# Patient Record
Sex: FEMALE | Race: WHITE | NOT HISPANIC OR LATINO | Employment: PART TIME | URBAN - METROPOLITAN AREA
[De-identification: names, ages, dates, MRNs, and addresses within clinical notes are randomized per-mention and may not be internally consistent; named-entity substitution may affect disease eponyms.]

---

## 2023-03-28 DIAGNOSIS — F41.9 ANXIETY: ICD-10-CM

## 2023-03-28 RX ORDER — BUSPIRONE HYDROCHLORIDE 7.5 MG/1
1 TABLET ORAL 3 TIMES DAILY
COMMUNITY
Start: 2022-02-14 | End: 2023-03-28 | Stop reason: SDUPTHER

## 2023-03-28 RX ORDER — BUSPIRONE HYDROCHLORIDE 7.5 MG/1
7.5 TABLET ORAL 3 TIMES DAILY
Qty: 270 TABLET | Refills: 1 | Status: SHIPPED | OUTPATIENT
Start: 2023-03-28 | End: 2023-03-29 | Stop reason: SDUPTHER

## 2023-03-29 DIAGNOSIS — F41.9 ANXIETY: ICD-10-CM

## 2023-03-29 RX ORDER — BUSPIRONE HYDROCHLORIDE 7.5 MG/1
7.5 TABLET ORAL 3 TIMES DAILY
Qty: 270 TABLET | Refills: 0 | OUTPATIENT
Start: 2023-03-29

## 2023-03-29 RX ORDER — BUSPIRONE HYDROCHLORIDE 7.5 MG/1
7.5 TABLET ORAL 3 TIMES DAILY
Qty: 270 TABLET | Refills: 0 | Status: SHIPPED | OUTPATIENT
Start: 2023-03-29 | End: 2023-03-31 | Stop reason: SDUPTHER

## 2023-03-30 DIAGNOSIS — F41.9 ANXIETY: ICD-10-CM

## 2023-03-31 RX ORDER — BUSPIRONE HYDROCHLORIDE 7.5 MG/1
7.5 TABLET ORAL 3 TIMES DAILY
Qty: 270 TABLET | Refills: 0 | Status: SHIPPED | OUTPATIENT
Start: 2023-03-31 | End: 2023-06-27

## 2023-03-31 RX ORDER — BUSPIRONE HYDROCHLORIDE 7.5 MG/1
TABLET ORAL
Qty: 270 TABLET | Refills: 2 | OUTPATIENT
Start: 2023-03-31

## 2023-06-26 DIAGNOSIS — F41.9 ANXIETY: ICD-10-CM

## 2023-06-27 RX ORDER — BUSPIRONE HYDROCHLORIDE 7.5 MG/1
TABLET ORAL
Qty: 270 TABLET | Refills: 0 | Status: SHIPPED | OUTPATIENT
Start: 2023-06-27 | End: 2023-09-15

## 2023-09-09 ASSESSMENT — PROMIS GLOBAL HEALTH SCALE
RATE_AVERAGE_PAIN: 0
CARRYOUT_PHYSICAL_ACTIVITIES: COMPLETELY
RATE_AVERAGE_FATIGUE: MILD
RATE_PHYSICAL_HEALTH: VERY GOOD
RATE_GENERAL_HEALTH: VERY GOOD
RATE_SOCIAL_SATISFACTION: EXCELLENT
CARRYOUT_SOCIAL_ACTIVITIES: EXCELLENT
RATE_MENTAL_HEALTH: GOOD
RATE_QUALITY_OF_LIFE: VERY GOOD
EMOTIONAL_PROBLEMS: RARELY

## 2023-09-11 ENCOUNTER — OFFICE VISIT (OUTPATIENT)
Dept: PRIMARY CARE | Facility: CLINIC | Age: 38
End: 2023-09-11
Payer: COMMERCIAL

## 2023-09-11 VITALS
RESPIRATION RATE: 16 BRPM | HEIGHT: 67 IN | SYSTOLIC BLOOD PRESSURE: 122 MMHG | DIASTOLIC BLOOD PRESSURE: 72 MMHG | HEART RATE: 74 BPM | WEIGHT: 148 LBS | BODY MASS INDEX: 23.23 KG/M2 | TEMPERATURE: 97.4 F

## 2023-09-11 DIAGNOSIS — Z23 IMMUNIZATION DUE: ICD-10-CM

## 2023-09-11 DIAGNOSIS — Z00.00 ROUTINE GENERAL MEDICAL EXAMINATION AT A HEALTH CARE FACILITY: Primary | ICD-10-CM

## 2023-09-11 PROBLEM — J45.909 ASTHMA (HHS-HCC): Status: ACTIVE | Noted: 2023-09-11

## 2023-09-11 PROBLEM — G43.909 MIGRAINE: Status: ACTIVE | Noted: 2023-09-11

## 2023-09-11 PROBLEM — J30.9 ALLERGIC RHINITIS: Status: ACTIVE | Noted: 2023-09-11

## 2023-09-11 PROBLEM — E53.8 VITAMIN B12 DEFICIENCY: Status: ACTIVE | Noted: 2023-09-11

## 2023-09-11 PROBLEM — K59.09 CHRONIC CONSTIPATION: Status: ACTIVE | Noted: 2023-09-11

## 2023-09-11 PROBLEM — F41.9 ANXIETY DISORDER: Status: ACTIVE | Noted: 2023-09-11

## 2023-09-11 PROBLEM — E55.9 VITAMIN D DEFICIENCY: Status: ACTIVE | Noted: 2023-09-11

## 2023-09-11 PROBLEM — K21.9 GERD (GASTROESOPHAGEAL REFLUX DISEASE): Status: ACTIVE | Noted: 2023-09-11

## 2023-09-11 PROCEDURE — 1036F TOBACCO NON-USER: CPT | Performed by: FAMILY MEDICINE

## 2023-09-11 PROCEDURE — 90471 IMMUNIZATION ADMIN: CPT | Performed by: FAMILY MEDICINE

## 2023-09-11 PROCEDURE — 99395 PREV VISIT EST AGE 18-39: CPT | Performed by: FAMILY MEDICINE

## 2023-09-11 PROCEDURE — 90686 IIV4 VACC NO PRSV 0.5 ML IM: CPT | Performed by: FAMILY MEDICINE

## 2023-09-11 RX ORDER — CARBINOXAMINE MALEATE 4 MG/1
TABLET ORAL
COMMUNITY

## 2023-09-11 RX ORDER — TEZEPELUMAB-EKKO 210 MG/1.9ML
210 INJECTION, SOLUTION SUBCUTANEOUS
COMMUNITY
Start: 2023-06-21

## 2023-09-11 RX ORDER — PNV NO.95/FERROUS FUM/FOLIC AC 28MG-0.8MG
1 TABLET ORAL EVERY OTHER DAY
COMMUNITY

## 2023-09-11 RX ORDER — BUDESONIDE, GLYCOPYRROLATE, AND FORMOTEROL FUMARATE 160; 9; 4.8 UG/1; UG/1; UG/1
2 AEROSOL, METERED RESPIRATORY (INHALATION) EVERY 12 HOURS
COMMUNITY

## 2023-09-11 RX ORDER — ALBUTEROL SULFATE 90 UG/1
2 AEROSOL, METERED RESPIRATORY (INHALATION) EVERY 4 HOURS PRN
COMMUNITY
Start: 2019-12-06

## 2023-09-11 RX ORDER — MONTELUKAST SODIUM 10 MG/1
10 TABLET ORAL DAILY
COMMUNITY
End: 2023-12-11 | Stop reason: ALTCHOICE

## 2023-09-11 RX ORDER — LINACLOTIDE 145 UG/1
145 CAPSULE, GELATIN COATED ORAL DAILY
COMMUNITY

## 2023-09-11 RX ORDER — LORATADINE 10 MG/1
10 TABLET ORAL DAILY
COMMUNITY
Start: 2023-08-31 | End: 2023-09-11 | Stop reason: ALTCHOICE

## 2023-09-11 NOTE — PROGRESS NOTES
"Subjective   Patient ID: Eden Quispe is a 38 y.o. female who presents for Annual Exam (Patient also wants the flu shot. ).    Subjective  Reason for Visit: Eden Quispe is an 38 y.o. female here for a Wellness visit.   Problems: No    Past Medical, Surgical, and Family History reviewed and updated in chart.    Reviewed all medications by prescribing practitioner or clinical pharmacist (such as prescriptions, OTCs, herbal therapies and supplements) and documented in the medical record.  Does well on Linzess for the constipation      Menses regular: Yes  PAP: Yes    Date: per gyn   Normal PAP: Yes  Sexually active: Yes  Dentist: Yes  Vitamins: Yes  Exercise:  Yes  Immunization up to date: Yes               Review of Systems    Objective   /72   Pulse 74   Temp 36.3 °C (97.4 °F)   Resp 16   Ht 1.702 m (5' 7\")   Wt 67.1 kg (148 lb)   BMI 23.18 kg/m²     Physical Exam  Vitals and nursing note reviewed.   Constitutional:       Appearance: Normal appearance.   HENT:      Head: Normocephalic.      Right Ear: Tympanic membrane, ear canal and external ear normal. There is no impacted cerumen.      Left Ear: Tympanic membrane, ear canal and external ear normal. There is no impacted cerumen.      Nose: Nose normal.      Mouth/Throat:      Mouth: Mucous membranes are moist.   Eyes:      Conjunctiva/sclera: Conjunctivae normal.   Neck:      Thyroid: No thyroid mass or thyromegaly.   Cardiovascular:      Rate and Rhythm: Normal rate and regular rhythm.   Pulmonary:      Effort: Pulmonary effort is normal.      Breath sounds: Normal breath sounds.   Chest:   Breasts:     Right: No inverted nipple, mass, nipple discharge, skin change or tenderness.      Left: No inverted nipple, mass, nipple discharge, skin change or tenderness.   Abdominal:      General: Abdomen is flat.      Palpations: Abdomen is soft. There is no mass.      Tenderness: There is no abdominal tenderness.   Musculoskeletal:      Cervical back: No " tenderness.   Lymphadenopathy:      Cervical: No cervical adenopathy.   Skin:     General: Skin is warm.      Findings: No rash.   Neurological:      General: No focal deficit present.      Mental Status: She is alert and oriented to person, place, and time.   Psychiatric:         Mood and Affect: Mood normal.         Behavior: Behavior normal.         Thought Content: Thought content normal.         Judgment: Judgment normal.         Assessment/Plan   Problem List Items Addressed This Visit    None  Visit Diagnoses       Routine general medical examination at a health care facility    -  Primary    Immunization due        Relevant Orders    Flu vaccine (IIV4) age 6 months and greater, preservative free (Completed)

## 2023-09-14 DIAGNOSIS — F41.9 ANXIETY: ICD-10-CM

## 2023-09-15 RX ORDER — BUSPIRONE HYDROCHLORIDE 7.5 MG/1
TABLET ORAL
Qty: 270 TABLET | Refills: 0 | Status: SHIPPED | OUTPATIENT
Start: 2023-09-15 | End: 2023-11-07

## 2023-11-06 DIAGNOSIS — F41.9 ANXIETY: ICD-10-CM

## 2023-11-07 RX ORDER — BUSPIRONE HYDROCHLORIDE 7.5 MG/1
TABLET ORAL
Qty: 270 TABLET | Refills: 1 | Status: SHIPPED | OUTPATIENT
Start: 2023-11-07

## 2023-12-11 ENCOUNTER — OFFICE VISIT (OUTPATIENT)
Dept: PRIMARY CARE | Facility: CLINIC | Age: 38
End: 2023-12-11
Payer: COMMERCIAL

## 2023-12-11 VITALS
HEART RATE: 83 BPM | BODY MASS INDEX: 23.86 KG/M2 | SYSTOLIC BLOOD PRESSURE: 136 MMHG | HEIGHT: 67 IN | WEIGHT: 152 LBS | DIASTOLIC BLOOD PRESSURE: 77 MMHG

## 2023-12-11 DIAGNOSIS — I87.2 CHRONIC VENOUS INSUFFICIENCY OF LOWER EXTREMITY: Primary | ICD-10-CM

## 2023-12-11 PROCEDURE — 99213 OFFICE O/P EST LOW 20 MIN: CPT | Performed by: INTERNAL MEDICINE

## 2023-12-11 PROCEDURE — 1036F TOBACCO NON-USER: CPT | Performed by: INTERNAL MEDICINE

## 2023-12-11 ASSESSMENT — LIFESTYLE VARIABLES
SKIP TO QUESTIONS 9-10: 1
HOW OFTEN DO YOU HAVE A DRINK CONTAINING ALCOHOL: MONTHLY OR LESS
AUDIT-C TOTAL SCORE: 1
HOW MANY STANDARD DRINKS CONTAINING ALCOHOL DO YOU HAVE ON A TYPICAL DAY: 1 OR 2
HOW OFTEN DO YOU HAVE SIX OR MORE DRINKS ON ONE OCCASION: NEVER

## 2023-12-11 ASSESSMENT — PATIENT HEALTH QUESTIONNAIRE - PHQ9
SUM OF ALL RESPONSES TO PHQ9 QUESTIONS 1 AND 2: 0
2. FEELING DOWN, DEPRESSED OR HOPELESS: NOT AT ALL
1. LITTLE INTEREST OR PLEASURE IN DOING THINGS: NOT AT ALL

## 2023-12-11 NOTE — PROGRESS NOTES
Assessment/Plan   38 years old female with history of chronic venous insufficiency and has had successful treatment has some residual surface vein including reticular and spider veins.  Patient's symptoms are out of proportion to the clinical and also ultrasound findings of the chronic venous insufficiency.  I reviewed the cosmetic treatments but also informed the patient that they may not improve patient's symptoms.  To confirm there is no worsening chronic venous insufficiency she will have a detailed venous ultrasound evaluation done.  If the venous ultrasound evaluation does not show any significant refluxing superficial or deep venous system or any other abnormalities patient understands she should go back to the primary care physician and discussed with them about the symptoms and the management options.  I advised her to start taking oral magnesium at this time.  She also could see a rheumatologist if it is agreed by the primary care physician after the evaluation from the primary care physician.  Patient verbalizes the understanding.    If the ultrasound scan is without any significant venous reflux or any other venous abnormalities I will follow her up in 6 months time.  Otherwise she will be seen sooner to decide about the interventions or management options.      Problem List Items Addressed This Visit       Chronic venous insufficiency of lower extremity - Primary    Relevant Orders    Vascular US Lower Extremity Vein Mapping Bilateral       Subjective     Patient ID: Eden Quispe is a 38 y.o. female who presents for Follow-up (Left leg pain).    History of present illness  38 years old female who has had chronic venous insufficiency and successfully treated came for a follow-up visit and having concern of her leg symptoms which exacerbated or started recently.  She is complaining of cramps in her both thighs more in the medial side of the left thigh.  She has some reticular veins and she says that  has slightly increased.  She gets occasional swellings.  She is wearing the graduated compression stockings as much as possible.    She denies any recent history of travel.    No history of DVT or PE.    Patient's previous records and the treatments were reviewed in detail.  ROS    Family History   Problem Relation Name Age of Onset    No Known Problems Mother      No Known Problems Father        Social History     Socioeconomic History    Marital status:      Spouse name: Not on file    Number of children: Not on file    Years of education: Not on file    Highest education level: Not on file   Occupational History    Not on file   Tobacco Use    Smoking status: Never    Smokeless tobacco: Never   Substance and Sexual Activity    Alcohol use: Yes    Drug use: Never    Sexual activity: Not on file   Other Topics Concern    Not on file   Social History Narrative    Not on file     Social Determinants of Health     Financial Resource Strain: Not on file   Food Insecurity: Not on file   Transportation Needs: Not on file   Physical Activity: Not on file   Stress: Not on file   Social Connections: Not on file   Intimate Partner Violence: Not on file   Housing Stability: Not on file      Patient has no known allergies.   Current Outpatient Medications   Medication Sig Dispense Refill    albuterol 90 mcg/actuation inhaler Inhale 2 puffs every 4 hours if needed for wheezing.      BACILLUS COAGULANS-INULIN ORAL Take 1 capsule by mouth once daily.      Breztri Aerosphere 160-9-4.8 mcg/actuation HFA aerosol inhaler Inhale 2 puffs every 12 hours.      busPIRone (Buspar) 7.5 mg tablet TAKE ONE TABLET BY MOUTH THREE TIMES A DAY (MORNING, EVENING, AND BEFORE BEDTIME) 270 tablet 1    carbinoxamine maleate 4 mg tablet TAKE ONE TABLET BY MOUTH EVERY 12HRS      cyanocobalamin (Vitamin B-12) 100 mcg tablet Take 1 tablet (100 mcg) by mouth every other day.      Linzess 145 mcg capsule Take 1 capsule (145 mcg) by mouth once daily.       tezepelumab-ekko (Tezspire) SubQ Pen Injector Inject 210 mg under the skin.       No current facility-administered medications for this visit.        Objective     Vitals:    12/11/23 1545   BP: 136/77   Pulse: 83        Physical Exam  Normal-built, well-nourished  with no apparent distress. Alert oriented  Skin:  Normal turgor.  No rash.  Head:  Normocephalic, atraumatic.  Eyes:  Pupils are equal, round,.  No pallor of conjunctivae.  Mucous membranes are moist.  Neck:  Supple.  No JVD.  No carotid bruit.  No thyromegaly. No cervical lymphadenopathy.  No clubbing  Chest:  Vesicular breathing Bilaterally good air entry.  No wheezing.  No crackles.  Heart:  Regular rate and rhythm.  S1, S2 positive.  No murmur.  Abdomen:  Soft and nontender.  Bowel sounds are positive.  No organomegaly.  Extremities:  Bilaterally no pedal pitting edema.  Bilaterally 2+ dorsalis pedis pulses.  No calf tenderness. Homans sign is negative.  Neuro Exam: No focal signs. Gait is normal.     Venous Exam:  Varicose veins in left calf [absent  Varicose veins in left thigh [absent  Varicose veins in right calf [absent  Varicose veins in right posterior thigh [present  Reticular veins in left calf [present  Reticular veins in left thigh [present  Reticular veins in right calf [present  Reticular veins in right thigh [present  Telangiectasias in left calf [present  Telangiectasias in left thigh [present  Telangiectasias in right calf [present].  Telangiectasias in right thigh [present].  Right leg edema [absent  Left leg edema [absent  Hyperpigmentation in left leg [absent  Hyperpigmentation in right leg [absent  Lipodermatosclerosis in right leg absent].  Lipodermatosclerosis in left leg [absent  Dermatitis in left leg [absent  Dermatitis in right leg [absent  Corona phlebectatica in left leg [absent  Corona phlebectatica in right leg [absent  Atrophe davida in left leg [absent  Atrophe davida in right leg [absent  Ulcer(s) in left leg  [absent  Ulcer(s) in right leg [absent        Bilateral CEAP C2rS      Problem List Items Addressed This Visit       Chronic venous insufficiency of lower extremity - Primary    Relevant Orders    Vascular US Lower Extremity Vein Mapping Bilateral        No orders of the defined types were placed in this encounter.       Lab Results   Component Value Date    WBC 5.8 12/20/2022    HGB 12.9 12/20/2022    HCT 39.7 12/20/2022     12/20/2022    CHOL 202 (H) 12/02/2019    TRIG 75 12/02/2019    HDL 85.0 12/02/2019    ALT 13 12/20/2022    AST 16 12/20/2022     12/20/2022    K 3.9 12/20/2022     12/20/2022    CREATININE 0.94 12/20/2022    BUN 15 12/20/2022    CO2 28 12/20/2022    TSH 1.57 06/24/2020     Lab Results   Component Value Date    CHOL 202 (H) 12/02/2019    CHHDL 2.4 12/02/2019       No results found.

## 2023-12-13 ENCOUNTER — OFFICE VISIT (OUTPATIENT)
Dept: PRIMARY CARE | Facility: CLINIC | Age: 38
End: 2023-12-13
Payer: COMMERCIAL

## 2023-12-13 ENCOUNTER — ANCILLARY PROCEDURE (OUTPATIENT)
Dept: RADIOLOGY | Facility: CLINIC | Age: 38
End: 2023-12-13
Payer: COMMERCIAL

## 2023-12-13 VITALS
HEIGHT: 67 IN | HEART RATE: 72 BPM | SYSTOLIC BLOOD PRESSURE: 122 MMHG | DIASTOLIC BLOOD PRESSURE: 74 MMHG | TEMPERATURE: 97.5 F | RESPIRATION RATE: 16 BRPM | WEIGHT: 154 LBS | BODY MASS INDEX: 24.17 KG/M2

## 2023-12-13 DIAGNOSIS — M25.562 LEFT KNEE PAIN, UNSPECIFIED CHRONICITY: ICD-10-CM

## 2023-12-13 DIAGNOSIS — M25.552 PAIN OF LEFT HIP: ICD-10-CM

## 2023-12-13 DIAGNOSIS — M79.652 PAIN OF LEFT THIGH: Primary | ICD-10-CM

## 2023-12-13 PROCEDURE — 73562 X-RAY EXAM OF KNEE 3: CPT | Mod: LEFT SIDE | Performed by: RADIOLOGY

## 2023-12-13 PROCEDURE — 73502 X-RAY EXAM HIP UNI 2-3 VIEWS: CPT | Mod: LEFT SIDE | Performed by: RADIOLOGY

## 2023-12-13 PROCEDURE — 99213 OFFICE O/P EST LOW 20 MIN: CPT | Performed by: FAMILY MEDICINE

## 2023-12-13 PROCEDURE — 73562 X-RAY EXAM OF KNEE 3: CPT | Mod: LT,FY

## 2023-12-13 PROCEDURE — 73502 X-RAY EXAM HIP UNI 2-3 VIEWS: CPT | Mod: LT,FY

## 2023-12-13 PROCEDURE — 1036F TOBACCO NON-USER: CPT | Performed by: FAMILY MEDICINE

## 2023-12-13 RX ORDER — MELOXICAM 15 MG/1
15 TABLET ORAL DAILY
Qty: 30 TABLET | Refills: 1 | Status: SHIPPED | OUTPATIENT
Start: 2023-12-13 | End: 2024-02-05

## 2023-12-13 NOTE — PROGRESS NOTES
"Subjective   Patient ID: Eden Quispe is a 38 y.o. female who presents for Leg Pain (Patient C/O left thigh pain for about a month. ).    Saw vein doctor for the pain and felt not from veins     Dull achy pain left inner leg    Hurts walking   pain in groin down inner leg   better move around     Had burning both inner legs other day when driving   Started when was walking on treadmill  Did insanity work out  Advil helped   No paresthesias   or weakness           Review of Systems    Objective   /74   Pulse 72   Temp 36.4 °C (97.5 °F)   Resp 16   Ht 1.702 m (5' 7\")   Wt 69.9 kg (154 lb)   BMI 24.12 kg/m²     Physical Exam  Musculoskeletal:      Left upper leg: Normal.        Legs:       Comments: Tenderness          Assessment/Plan   Problem List Items Addressed This Visit    None  Visit Diagnoses         Codes    Pain of left thigh    -  Primary M79.652    Relevant Orders    Referral to Sports Medicine    Pain of left hip     M25.552    Relevant Medications    meloxicam (Mobic) 15 mg tablet    Other Relevant Orders    XR hip left with pelvis when performed 2 or 3 views    Left knee pain, unspecified chronicity     M25.562    Relevant Orders    XR knee left 3 views               "

## 2023-12-18 ENCOUNTER — OFFICE VISIT (OUTPATIENT)
Dept: ORTHOPEDIC SURGERY | Facility: CLINIC | Age: 38
End: 2023-12-18
Payer: COMMERCIAL

## 2023-12-18 ENCOUNTER — ANCILLARY PROCEDURE (OUTPATIENT)
Dept: RADIOLOGY | Facility: CLINIC | Age: 38
End: 2023-12-18
Payer: COMMERCIAL

## 2023-12-18 DIAGNOSIS — S76.112A QUADRICEPS STRAIN, LEFT, INITIAL ENCOUNTER: Primary | ICD-10-CM

## 2023-12-18 DIAGNOSIS — S76.112A QUADRICEPS STRAIN, LEFT, INITIAL ENCOUNTER: ICD-10-CM

## 2023-12-18 PROCEDURE — 73552 X-RAY EXAM OF FEMUR 2/>: CPT | Mod: LEFT SIDE | Performed by: RADIOLOGY

## 2023-12-18 PROCEDURE — 73552 X-RAY EXAM OF FEMUR 2/>: CPT | Mod: LT,FY

## 2023-12-18 PROCEDURE — 99204 OFFICE O/P NEW MOD 45 MIN: CPT | Performed by: EMERGENCY MEDICINE

## 2023-12-18 PROCEDURE — 1036F TOBACCO NON-USER: CPT | Performed by: EMERGENCY MEDICINE

## 2023-12-18 ASSESSMENT — PAIN DESCRIPTION - DESCRIPTORS: DESCRIPTORS: DULL;ACHING

## 2023-12-18 ASSESSMENT — PAIN - FUNCTIONAL ASSESSMENT: PAIN_FUNCTIONAL_ASSESSMENT: 0-10

## 2023-12-18 ASSESSMENT — PAIN SCALES - GENERAL: PAINLEVEL_OUTOF10: 3

## 2023-12-18 NOTE — PROGRESS NOTES
Subjective    Patient ID: Eden Quispe is a 38 y.o. female.    Chief Complaint: Pain of the Left Knee (NPV - Left thigh, knee and calf pain. No injury. )     Last Surgery: No surgery found  Last Surgery Date: No surgery found    Eden is a very pleasant 38-year-old female coming in to see me today for left thigh pain.  She was referred here by her PCP Dr. Barry.  She has been having atraumatic left thigh pain since the middle of October.  She had not been very active and had decided to try to start doing an at home exercise program within insanity class.  However after about a week of the classes she was having moderate pain in her thigh located mostly along the anterior medial aspect midway down the left thigh radiating down past the medial aspect of the left knee towards her calf.  Her pain is better now but she is still not very active because she does have pain with working out.  Her pain is also worse with sitting.  Here today it is extremely mild and dull rated 1 or 2 out of 10.  She recently saw her PCP who started her on meloxicam.  She has not yet started physical therapy.  She has a history of a vitamin B12 deficiency.  She has no back pain.  She has no hip pain or isolated knee pain.  She denies any known trauma or injuries.        Objective   Right Knee Exam   Right knee exam is normal.      Left Knee Exam     Muscle Strength   The patient has normal left knee strength.    Tenderness   Left knee tenderness location: Mild discomfort with deep palpation over the left thigh adductors and distal vastus medialis area.    Range of Motion   The patient has normal left knee ROM.    Tests   Mina:  Medial - negative Lateral - negative  Varus: negative Valgus: negative  Lachman:  Anterior - negative      Drawer:  Anterior - negative     Posterior - negative    Other   Erythema: absent  Sensation: normal  Pulse: present  Swelling: none  Effusion: no effusion present    Comments:  Tight hamstrings  Pain  is reproduced with left knee extension against resistance  Left hip exam is normal with normal range of motion and strength testing.  Negative FADIR and negative Ulises            Image Results:  XR hip left with pelvis when performed 2 or 3 views  Narrative: Interpreted By:  Jen Ling,   STUDY:  Left hip, two views.      INDICATION:  Signs/Symptoms:left hip pain.      COMPARISON:  None.      ACCESSION NUMBER(S):  [id number]      ORDERING CLINICIAN:  ABRAHAM CLAY      FINDINGS:  No acute fracture or malalignment.  Left hip joint space is well maintained.  Soft tissues are within normal limits.      Impression: 1. Unremarkable left hip radiographs.      MACRO:  None.      Signed by: Jen Ling 12/14/2023 5:31 PM  Dictation workstation:   [id number]  XR knee left 3 views  Narrative: Interpreted By:  Jen Ling,   STUDY:  Left knee, 3 views.      INDICATION:  Signs/Symptoms:left hip pain.      COMPARISON:  None.      ACCESSION NUMBER(S):  [id number]      ORDERING CLINICIAN:  ABRAHAM CLAY      FINDINGS:  No acute fracture or malalignment.  Joint spaces are well maintained.  No significant knee joint effusion.  Soft tissues are unremarkable.      Impression: 1. Unremarkable left knee radiographs.      MACRO:  None.      Signed by: Jen Ling 12/14/2023 5:31 PM  Dictation workstation:   [id number]    X-rays of the left hip and knee were reviewed and interpreted by me on 12/18/2023 and showed good joint spacing without any evidence of acute injuries or fractures.  Good joint alignment.    Assessment/Plan   Encounter Diagnoses:  Quadriceps strain, left, initial encounter    Orders Placed This Encounter    XR femur left 2+ views    Referral to Physical Therapy     No follow-ups on file.    We discussed her treatment options and agreed to obtain an x-ray of her left femur because of the location of her pain.  However I do believe that her presentation is most consistent with a strain to her  left medial quad in the setting of very tight hamstrings.  We therefore also agreed to send her to physical therapy and for her to develop a home exercise program.  She will also continue taking her meloxicam and will follow-up with me in 4 to 6 weeks.  If she is not much better at that time we could potentially obtain an MRI or consider other options such as an EMG or PRP injections of the left quad.    ** Please excuse any errors in grammar or translation related to this dictation. Voice recognition software was utilized to prepare this document. **       Javy Levy MD  [facility]

## 2023-12-28 ENCOUNTER — APPOINTMENT (OUTPATIENT)
Dept: PRIMARY CARE | Facility: CLINIC | Age: 38
End: 2023-12-28
Payer: COMMERCIAL

## 2024-01-05 NOTE — PROGRESS NOTES
"Subjective   Eden Quispe is a 38 y.o. female who is here for an annual gyn exam.     Concerns for this visit: {GYN Concerns Drop:61038}      Periods are {gyn period regularity:715}, lasting {numbers; 0-10:60088} days. Dysmenorrhea:{gyn dysmenorrhea:716}. Cyclic symptoms include {sys gyn cyclic sx:35891}. No intermenstrual bleeding, spotting, or discharge.  Current contraception: {contraceptive method:5051}  Last PAP: 3/22/20 NML HPV -  History of abnormal Pap smear: {yes***/no:22638::\"no\"}  Family history of uterine or ovarian cancer: {yes***/no:91661::\"no\"}  History of abnormal mammogram: {yes***/no:39312::\"no\"}  Family history of breast cancer: {yes***/no:85606::\"no\"}    Review of Systems    Objective   There were no vitals taken for this visit.  Physical Exam     Assessment/Plan: 38 y.o. yo woman for annual GYN exam    1) Health maintenance:   Pap guidelines discussed (ASCCP). PAP ***  Self breast exam encouraged - mammograms annually starting at age 40.   Diet and exercise reviewed.  Routine follow up with PCP for health maintenance examination encouraged including TSH, cholesterol and vitamin D evaluation.    2) Contraception:  ***; risk factors reviewed and patient meets criteria to continue on this method. Contraceptive options reviewed and information provided.    3) STD screening  ***  {There are no diagnoses linked to this encounter. (Refresh or delete this SmartLink)}   F/U 1 year or as needed  "

## 2024-01-08 ENCOUNTER — OFFICE VISIT (OUTPATIENT)
Dept: OBSTETRICS AND GYNECOLOGY | Facility: CLINIC | Age: 39
End: 2024-01-08
Payer: COMMERCIAL

## 2024-01-08 VITALS
DIASTOLIC BLOOD PRESSURE: 68 MMHG | HEIGHT: 67 IN | SYSTOLIC BLOOD PRESSURE: 118 MMHG | BODY MASS INDEX: 24.4 KG/M2 | WEIGHT: 155.5 LBS

## 2024-01-08 DIAGNOSIS — Z12.31 SCREENING MAMMOGRAM FOR BREAST CANCER: ICD-10-CM

## 2024-01-08 PROCEDURE — 1036F TOBACCO NON-USER: CPT

## 2024-01-08 PROCEDURE — 99395 PREV VISIT EST AGE 18-39: CPT

## 2024-01-08 ASSESSMENT — ENCOUNTER SYMPTOMS
MUSCULOSKELETAL NEGATIVE: 0
EYES NEGATIVE: 0
PSYCHIATRIC NEGATIVE: 0
CONSTITUTIONAL NEGATIVE: 0
ENDOCRINE NEGATIVE: 0
ALLERGIC/IMMUNOLOGIC NEGATIVE: 0
HEMATOLOGIC/LYMPHATIC NEGATIVE: 0
CARDIOVASCULAR NEGATIVE: 0
RESPIRATORY NEGATIVE: 0
GASTROINTESTINAL NEGATIVE: 0
NEUROLOGICAL NEGATIVE: 0

## 2024-01-08 NOTE — PROGRESS NOTES
"Pt has no concerns today.  Last pap 2020, nml, hpv neg  Mamm 1/11/2023 Normal  LMP 12/28/23      Subjective   Eden Quispe is a 38 y.o. female who is here for a routine exam. No vaginal concerns at this time including abnormal discharge, odor or discomfort. She is sexually active with one male partner and has no concern for STI exposure. She has no pain or bleeding with sex. She denies bladder or bowel concerns.      Current contraception: none  LMP: 12/28  Last pap: 2020  History of abnormal Pap smear: no  Due for pap: no  Family history of GYN cancers: unknown, adopted  Last mammogram: cat 3 probably benign due to asymmetry  Gardasil: no      OB History    No obstetric history on file.         Review of Systems   All other systems reviewed and are negative.      Objective   /68 (BP Location: Right arm, Patient Position: Sitting, BP Cuff Size: Adult)   Ht 1.702 m (5' 7\")   Wt 70.5 kg (155 lb 8 oz)   LMP 12/28/2023 (Exact Date)   BMI 24.35 kg/m²     Physical Exam  HENT:      Mouth/Throat:      Mouth: Mucous membranes are moist.   Eyes:      Pupils: Pupils are equal, round, and reactive to light.   Neck:      Thyroid: No thyroid mass, thyromegaly or thyroid tenderness.   Cardiovascular:      Rate and Rhythm: Normal rate and regular rhythm.      Pulses: Normal pulses.   Pulmonary:      Effort: Pulmonary effort is normal.      Breath sounds: Normal breath sounds.   Chest:   Breasts:     Breasts are symmetrical.      Right: Normal.      Left: Normal.   Abdominal:      General: Abdomen is flat.      Palpations: Abdomen is soft.      Hernia: There is no hernia in the left inguinal area or right inguinal area.   Genitourinary:     General: Normal vulva.      Uterus: Normal.       Adnexa: Right adnexa normal and left adnexa normal.   Musculoskeletal:         General: Normal range of motion.      Cervical back: Normal range of motion.   Lymphadenopathy:      Cervical: No cervical adenopathy.      Right cervical: " No superficial, deep or posterior cervical adenopathy.     Left cervical: No superficial, deep or posterior cervical adenopathy.      Lower Body: No right inguinal adenopathy. No left inguinal adenopathy.   Skin:     General: Skin is warm.      Capillary Refill: Capillary refill takes less than 2 seconds.   Neurological:      Mental Status: She is alert and oriented to person, place, and time.   Psychiatric:         Mood and Affect: Mood normal.         Behavior: Behavior normal.          Assessment/Plan   A&P --> 38 y.o. y.o woman for annual GYN exam.     - Health Maintenance -->  - Routine follow up with PCP for health maintenance examination encouraged, including TSH, cholesterol, and Vit. D evaluation.  Self breast awareness encouraged; concerning characteristics of breasts reviewed - Pt. will report general concerns, any adherent lumps, skin dimpling/puckering or color changes, and any nipple discharge. Mammogram order provided    Diet and exercise reviewed.     Pap, next due in 2025:- Reviewed ACOG and ASCCP guidelines with pt.        - STD Screening: declines     - Contraception Plan: none     - F/U 1 year or as needed.      RUFINO Kohli-CARMELA

## 2024-01-10 ENCOUNTER — OFFICE VISIT (OUTPATIENT)
Dept: ALLERGY | Facility: CLINIC | Age: 39
End: 2024-01-10
Payer: COMMERCIAL

## 2024-01-10 VITALS
RESPIRATION RATE: 14 BRPM | SYSTOLIC BLOOD PRESSURE: 118 MMHG | TEMPERATURE: 98.2 F | HEART RATE: 92 BPM | WEIGHT: 153 LBS | OXYGEN SATURATION: 99 % | DIASTOLIC BLOOD PRESSURE: 64 MMHG | BODY MASS INDEX: 23.96 KG/M2

## 2024-01-10 DIAGNOSIS — J45.50 SEVERE PERSISTENT ASTHMA WITHOUT COMPLICATION (MULTI): ICD-10-CM

## 2024-01-10 DIAGNOSIS — K21.9 GASTROESOPHAGEAL REFLUX DISEASE WITHOUT ESOPHAGITIS: ICD-10-CM

## 2024-01-10 DIAGNOSIS — J30.9 ALLERGIC RHINITIS, UNSPECIFIED SEASONALITY, UNSPECIFIED TRIGGER: Primary | ICD-10-CM

## 2024-01-10 PROCEDURE — 1036F TOBACCO NON-USER: CPT | Performed by: ALLERGY & IMMUNOLOGY

## 2024-01-10 PROCEDURE — 99214 OFFICE O/P EST MOD 30 MIN: CPT | Performed by: ALLERGY & IMMUNOLOGY

## 2024-01-10 ASSESSMENT — ENCOUNTER SYMPTOMS
ENDOCRINE NEGATIVE: 1
FEVER: 0
CARDIOVASCULAR NEGATIVE: 1
CONSTITUTIONAL NEGATIVE: 1
GASTROINTESTINAL NEGATIVE: 1
RESPIRATORY NEGATIVE: 1
PSYCHIATRIC NEGATIVE: 1
MUSCULOSKELETAL NEGATIVE: 1
EYES NEGATIVE: 1
HEMATOLOGIC/LYMPHATIC NEGATIVE: 1
NEUROLOGICAL NEGATIVE: 1

## 2024-01-10 NOTE — PROGRESS NOTES
Subjective   Patient ID: Eden Quispe is a 38 y.o. female.    Chief Complaint: Follow up asthma  Tezspire, Breztri and Albuterol  No oral steroids or hospitalizations.  Did have COVID 12/28-mostly fatigue/gi.  Has some drainage causing cough. No SOB or wheeze.    When she was unable to have access to her biologic X3 months due to inurance coverage/approval pending, she had increased symptoms and need for albuterol.     Review of Systems   Constitutional: Negative.  Negative for fever.   HENT:  Positive for congestion.    Eyes: Negative.    Respiratory: Negative.     Cardiovascular: Negative.    Gastrointestinal: Negative.    Endocrine: Negative.    Genitourinary: Negative.    Musculoskeletal: Negative.    Neurological: Negative.    Hematological: Negative.    Psychiatric/Behavioral: Negative.     /64   Pulse 92   Temp 36.8 °C (98.2 °F)   Resp 14   Wt 69.4 kg (153 lb)   LMP 12/28/2023 (Exact Date)   SpO2 99%   BMI 23.96 kg/m²       Objective   Physical Exam  Vitals and nursing note reviewed.   Constitutional:       Appearance: Normal appearance.   HENT:      Right Ear: Tympanic membrane normal.      Left Ear: Tympanic membrane normal.      Nose: Congestion present.      Mouth/Throat:      Mouth: Mucous membranes are moist.   Eyes:      Conjunctiva/sclera: Conjunctivae normal.      Pupils: Pupils are equal, round, and reactive to light.   Cardiovascular:      Rate and Rhythm: Normal rate and regular rhythm.      Heart sounds: Normal heart sounds.   Pulmonary:      Effort: Pulmonary effort is normal.      Breath sounds: Normal breath sounds.   Abdominal:      General: Bowel sounds are normal.      Palpations: Abdomen is soft.   Musculoskeletal:         General: Normal range of motion.   Skin:     General: Skin is warm and dry.      Capillary Refill: Capillary refill takes less than 2 seconds.   Neurological:      General: No focal deficit present.      Mental Status: She is alert and oriented to person,  place, and time.   Psychiatric:         Mood and Affect: Mood normal.         Assessment/Plan    Asthma well controlled on Breztri, Tezspire. No longer needing Singulair.  -consider trial of as needed AirSupra for rescue inhaler.  -will defer spirometry today due to recent illness, but plan for doing this at next follow up.  -if asthma continues to be stable, we can explore starting allergy immunotherapy.      Plan follow up in 4 months

## 2024-01-15 ENCOUNTER — ANCILLARY PROCEDURE (OUTPATIENT)
Dept: RADIOLOGY | Facility: CLINIC | Age: 39
End: 2024-01-15
Payer: COMMERCIAL

## 2024-01-15 DIAGNOSIS — Z12.31 SCREENING MAMMOGRAM FOR BREAST CANCER: ICD-10-CM

## 2024-01-15 PROCEDURE — 77067 SCR MAMMO BI INCL CAD: CPT

## 2024-01-15 PROCEDURE — 77063 BREAST TOMOSYNTHESIS BI: CPT | Performed by: STUDENT IN AN ORGANIZED HEALTH CARE EDUCATION/TRAINING PROGRAM

## 2024-01-15 PROCEDURE — 77067 SCR MAMMO BI INCL CAD: CPT | Performed by: STUDENT IN AN ORGANIZED HEALTH CARE EDUCATION/TRAINING PROGRAM

## 2024-01-18 ENCOUNTER — APPOINTMENT (OUTPATIENT)
Dept: PRIMARY CARE | Facility: CLINIC | Age: 39
End: 2024-01-18
Payer: COMMERCIAL

## 2024-01-23 ENCOUNTER — EVALUATION (OUTPATIENT)
Dept: PHYSICAL THERAPY | Facility: CLINIC | Age: 39
End: 2024-01-23
Payer: COMMERCIAL

## 2024-01-23 DIAGNOSIS — M79.652 MUSCULOSKELETAL PAIN OF LEFT THIGH: Primary | ICD-10-CM

## 2024-01-23 DIAGNOSIS — S76.112A QUADRICEPS STRAIN, LEFT, INITIAL ENCOUNTER: ICD-10-CM

## 2024-01-23 PROCEDURE — 97161 PT EVAL LOW COMPLEX 20 MIN: CPT | Mod: GP | Performed by: PHYSICAL THERAPIST

## 2024-01-23 PROCEDURE — 97110 THERAPEUTIC EXERCISES: CPT | Mod: GP | Performed by: PHYSICAL THERAPIST

## 2024-01-23 ASSESSMENT — PAIN SCALES - GENERAL: PAINLEVEL_OUTOF10: 0 - NO PAIN

## 2024-01-23 ASSESSMENT — PAIN - FUNCTIONAL ASSESSMENT: PAIN_FUNCTIONAL_ASSESSMENT: 0-10

## 2024-01-23 ASSESSMENT — PAIN DESCRIPTION - DESCRIPTORS: DESCRIPTORS: ACHING;DULL

## 2024-01-23 NOTE — PROGRESS NOTES
Physical Therapy    Physical Therapy Evaluation and Treatment      Patient Name: Eden Quispe  MRN: [id number]  Today's Date: 1/23/2024  Time Calculation  Start Time: 1432  Stop Time: 1515  Time Calculation (min): 43 min    Insurance:  Visit:  1 of 40  Auth required: No  Payor: [employer] / Plan: [employer] / Product Type: *No Product type* /     Assessment:  PT Assessment  PT Assessment Results: Decreased strength, Decreased range of motion, Pain  Rehab Prognosis: Good  Evaluation/Treatment Tolerance: Patient tolerated treatment well   Patient presents to physical therapy with c/o left quadriceps strain. Patient reports that pain started in L inner thigh in October. She is unsure if it was from starting an Ins[employer] workout program or walking frequently on a treadmill, but suspects her injury was exercise induced. She describes it as a dull pain in the upper medial aspect of the L knee and into the thigh. Pain occasionally radiates into the calf. Pt states she feel tightness on the inside of her thigh and calf as well. Pt presents with decreased hip strength, pain, and tightness in the adductors and calf. S/s consistent with referring diagnosis.     Plan:  OP PT Plan  Treatment/Interventions: Blood flow restriction therapy, Cryotherapy, Dry needling, Education/ Instruction, Electrical stimulation, Gait training, Hot pack, Manual therapy, Neuromuscular re-education, Self care/ home management, Taping techniques, Therapeutic activities, Therapeutic exercises, Ultrasound  PT Plan: Skilled PT  PT Frequency: 1 time per week  Duration: 12 weeks  Onset Date: 10/01/23  Rehab Potential: Good  Plan of Care Agreement: Patient    Current Problem:   1. Musculoskeletal pain of left thigh  Follow Up In Physical Therapy      2. Quadriceps strain, left, initial encounter  Referral to Physical Therapy    Follow Up In Physical Therapy          Subjective    Patient presents to physical therapy for  evaluation of L quad strain. Patient reports that pain started in L inner thigh in October. She is unsure if it was from starting an Insanity workout program or walking frequently on a treadmill. She suspects that her injury was exercise induced. She describes it as a dull pain primarily in the upper medial aspect of the L knee/into the thigh. The pain also radiates down into the calf. Pt states she feel tightness on the inside of her thigh and calf.     PREVIOUS INTERVENTION:  Meloxicam    EXACERBATING FACTORS:  Sitting  High impact exercise    RELIEVING FACTORS:  Changing position  Meloxicam- has helped a lot     OCCUPATION:       HOBBIES:  Regular workout routine    HOME SETUP:  No difficulty on stairs     BARRIERS IMPACTING CARE:  N/A    General:  General  Reason for Referral: Left quadriceps strain  Referred By: Dr. Levy    Precautions:  Precautions  Medical Precautions: No known precautions/limitation (Medical hx reviewed. Not likely to impact care.)    Pain:  Pain Assessment  Pain Assessment: 0-10  Pain Score: 0 - No pain  Pain Location: Knee  Pain Orientation: Left  Pain Descriptors: Aching, Dull  Pain Frequency: Intermittent    Prior Level of Function:  Prior Function Per Pt/Caregiver Report  Level of H[place]: Independent with ADLs and functional transfers    Objective   OBSERVATION  Increased valgus with single leg squat R/L  Some difficulty with balance in SLS    AROM  R knee AROM WFL   L knee AROM WFL     STRENGTH  R hip flexion 5/5  R hip extension 4/5  R hip ABD 4+/5  R hip ER 5/5  R hip IR 5/5    L hip flexion 5/5  L hip extension 4-/5  L hip ABD 4+/5  L hip ER 5/5  L hip IR 5/5    PALPATION  Slight TTP L adductors    SPECIAL TESTS  (-) MEHNAZ  (-) Scour  (-) FADDIR    Outcome Measures:  Other Measures  Lower Extremity Funtional Score (LEFS): 78/80     Treatments:  TREATMENT  THERAPEUTIC EXERCISE:  Access Code: [email or web address]  URL:  [email or web address]  Date: 01/23/2024  Prepared by: Leatha Noonan    Exercises  - Supine Bridge  - 2 x daily - 7 x weekly - 1 sets - 10 reps  - Standing Isometric Hip Abduction with Ball on Wall  - 1 x daily - 7 x weekly - 1 sets - 10 reps  - Clamshell  - 2 x daily - 7 x weekly - 1 sets - 10 reps  - Sidelying Hip Abduction  - 2 x daily - 7 x weekly - 1 sets - 10 reps  - Gastroc Stretch on Step  - 2 x daily - 7 x weekly - 1 sets - 10 reps  - Side Lunge Adductor Stretch  - 2 x daily - 7 x weekly - 1 sets - 10 reps    EDUCATION:   Patient education on diagnosis/prognosis, pathophysiology, POC, and HEP.  Patient demonstrates understanding of HEP/POC.    Goals:  1. Pain 0/10  2. Outcomes Measure:  LEFS 80/80  3. Increase hip extension/abduction to 5/5 to allow patient to resume impact loading w/o increased symptoms  4. Demonstrates independence with HEP.    Care was provided for this patient by ADRIÁN Garcia.

## 2024-01-30 ENCOUNTER — TREATMENT (OUTPATIENT)
Dept: PHYSICAL THERAPY | Facility: CLINIC | Age: 39
End: 2024-01-30
Payer: COMMERCIAL

## 2024-01-30 DIAGNOSIS — M79.652 MUSCULOSKELETAL PAIN OF LEFT THIGH: ICD-10-CM

## 2024-01-30 DIAGNOSIS — S76.112A QUADRICEPS STRAIN, LEFT, INITIAL ENCOUNTER: Primary | ICD-10-CM

## 2024-01-30 PROCEDURE — 97112 NEUROMUSCULAR REEDUCATION: CPT | Mod: GP | Performed by: PHYSICAL THERAPIST

## 2024-01-30 PROCEDURE — 97110 THERAPEUTIC EXERCISES: CPT | Mod: GP | Performed by: PHYSICAL THERAPIST

## 2024-01-30 PROCEDURE — 97140 MANUAL THERAPY 1/> REGIONS: CPT | Mod: GP | Performed by: PHYSICAL THERAPIST

## 2024-01-30 ASSESSMENT — PAIN - FUNCTIONAL ASSESSMENT: PAIN_FUNCTIONAL_ASSESSMENT: 0-10

## 2024-01-30 ASSESSMENT — PAIN SCALES - GENERAL: PAINLEVEL_OUTOF10: 5 - MODERATE PAIN

## 2024-01-30 NOTE — PROGRESS NOTES
Physical Therapy    Physical Therapy Treatment    Patient Name: Eden Quispe  MRN: [id number]  Today's Date: 1/30/2024  Time Calculation  Start Time: 1420  Stop Time: 1514  Time Calculation (min): 54 min    Insurance:  Visit:  2 of 40  Auth required: No  Payor: [employer] / Plan: [employer] / Product Type: *No Product type* /    Assessment:   Pt required max cues for glute activation. Discussed with pt that medial thigh pain may be due to compensation for weak glutes and HS. Educated patient on attempting glute activation exercises before rest of HEP. Added additional glute activation exercises to HEP. Will continue to educate patient on understanding of this concept.     Plan:   Continue to work on glute activation. Progress hip strengthening as tolerated.     Current Problem  1. Quadriceps strain, left, initial encounter  Follow Up In Physical Therapy      2. Musculoskeletal pain of left thigh  Follow Up In Physical Therapy        General   General  Reason for Referral: Left quadriceps strain  Referred By: Dr. Levy    Subjective    She states that her pain has been coming and going. She still feels it primarily in her medial thigh. Sitting exacerbates symptoms, but propping legs up on a stool provides some relief. Has been walking on the treadmill but pain seems to increase by the end of the day. Compression socks provide some relief.      Precautions  Precautions  Medical Precautions: No known precautions/limitation (Medical hx reviewed. Not likely to impact care.)    Pain  Pain Assessment  Pain Assessment: 0-10  Pain Score: 5 - Moderate pain  Pain Location: Leg  Pain Orientation: Left    Objective   3+/5 L hip extension    Treatments:  THERAPEUTIC EXERCISE:  Recumbent bike random level 1 6 mins  Review of HEP  Active sup HS stretch in 90/90 10x each     MANUAL THERAPY:  Tiger tail to HS     NEUROMUSCULAR RE-EDUCATION:  Glute bridge w/ belt 10x   Quadruped kicks 5x each  Standing  iso hip abduction on wall 5x  3 way single leg stance clock tap 10x   Resisted glute bridge 10x    THERAPEUTIC ACTIVITY:    MODALITIES:      OP EDUCATION:   Access Code: [email or web address]  URL: [email or web address]  Date: 01/30/2024  Prepared by: Leatha Noonan    Exercises  - Supine Bridge  - 2 x daily - 7 x weekly - 1 sets - 10 reps  - Clamshell  - 2 x daily - 7 x weekly - 1 sets - 10 reps  - Sidelying Hip Abduction  - 2 x daily - 7 x weekly - 1 sets - 10 reps  - Gastroc Stretch on Step  - 2 x daily - 7 x weekly - 1 sets - 10 reps  - Side Lunge Adductor Stretch  - 2 x daily - 7 x weekly - 1 sets - 10 reps  - Single Leg Balance with Clock Reach  - 2 x daily - 7 x weekly - 1 sets - 10 reps  - Seated Hamstring Stretch  - 2 x daily - 7 x weekly - 1 sets - 10 reps  - Quadruped Hip Extension Kicks  - 2 x daily - 7 x weekly - 1 sets - 10 reps    Goals:  1. Pain 0/10  2. Outcomes Measure:  LEFS 80/80  3. Increase hip extension/abduction to 5/5 to allow patient to resume impact loading w/o increased symptoms  4. Demonstrates independence with HEP.     Care was provided for this patient by ADRIÁN Garcia

## 2024-02-04 DIAGNOSIS — M25.552 PAIN OF LEFT HIP: ICD-10-CM

## 2024-02-05 ENCOUNTER — OFFICE VISIT (OUTPATIENT)
Dept: VASCULAR SURGERY | Facility: CLINIC | Age: 39
End: 2024-02-05
Payer: COMMERCIAL

## 2024-02-05 DIAGNOSIS — I87.2 CHRONIC VENOUS INSUFFICIENCY OF LOWER EXTREMITY: Primary | ICD-10-CM

## 2024-02-05 PROCEDURE — 1036F TOBACCO NON-USER: CPT | Performed by: INTERNAL MEDICINE

## 2024-02-05 PROCEDURE — 93970 EXTREMITY STUDY: CPT | Performed by: INTERNAL MEDICINE

## 2024-02-05 RX ORDER — MELOXICAM 15 MG/1
15 TABLET ORAL DAILY
Qty: 30 TABLET | Refills: 1 | Status: SHIPPED | OUTPATIENT
Start: 2024-02-05 | End: 2024-04-09 | Stop reason: SDUPTHER

## 2024-02-05 NOTE — PROGRESS NOTES
Venous Duplex      Indications:  Limb pain  Leg Edema    Sonographer: Beverly Marie RVT    TECHNIQUE:  Venous Duplex ultrasound spectral Doppler wave modality was performed with the patient in the supine and upright positions to determine the status of the deep and superficial systems of the right and left lower extremity. The common femoral, femoral and popliteal veins of the deep venous system were imaged. The superficial system was imaged entirely to include, but was not limited to, the saphenous-femoral junction (SFJ) down the greater saphenous vein from the groin to the ankle, and the saphenous-popliteal junction (SPJ), if present, at the popliteal fossa down the small saphenous vein (SSV) to the ankle. As indicated, the cranial extensions of the SSV (CESSV), the anterior accessory GSV (AAGSV), and the posterior accessory GSV (PAGSV) were also evaluated, if present. All areas meeting the criteria for venous reflux (500 milliseconds or greater in the saphenous veins and principle branches, 350 milliseconds in perforators and 1000 milliseconds in the deep system) were confirmed with spectral Doppler analysis and confirmatory images were documented:     FINDINGS ARE THE FOLLOWING:    RIGHT LEG:  There is no evidence of thrombus in the interrogated segments of the deep or superficial venous system. There is no evidence of deep venous reflux.     GSV dimensions: 4.2mm junction   proximal segment is not visualized  mid segment is not visualized   distal segment is not visualized  The Greater Saphenous Vein appears to be absent, consistent with previous treatment    SSV dimensions: junction is not visualized   mid segment is not visualized  The Small Saphenous Vein appears to be absent, consistent with previous treatment    AAGSV dimensions: 3.0mm   There is <0.5 seconds of reflux in the Anterior Accessory Saphenous Vein    PAGSV dimensions: 2.9mm   There is <0.5 seconds of reflux in the Posterior Accessory Saphenous  Vein    There are multiple incompetent tributaries along the thigh and lower leg.    Trib1 dimensions: 2.1mm  Trib2 dimensions: 2.3mm  There is >0.5 seconds of reflux in the tributaries       LEFT LEG:  There is no evidence of thrombus in the interrogated segments of the deep or superficial venous system. There is no evidence of deep venous reflux.    GSV dimensions: 3.4mm junction   proximal segment is not visualized  mid segment is not visualized   distal segment is not visualized  The Greater Saphenous Vein appears to be absent, consistent with previous treatment    SSV dimensions: 2.3mm junction  2.8mm mid  There is <0.5 seconds of reflux in the Small Saphenous Vein    AAGSV dimensions: 0.0mm   There is <0.5 seconds of reflux in the Anterior Accessory Saphenous Vein      CONCLUSIONS:  There is no evidence of thrombus in the interrogated segments of the deep or superficial venous system in both legs.  No evidence of deep venous reflux.  Both legs superficial truncal veins are successfully treated and no reflux noted.  There are incompetent tributaries are seen in the LT LE legs as noted in the MAP measured less than 2 mm in diameter.    Discussions  Patient's both legs has no significant refluxing superficial truncal veins or tributaries identified in these detail ultrasound evaluation.  Patient should continue the lifestyle modification and also bilateral thigh-high graduated compression stockings as much as possible.

## 2024-02-06 ENCOUNTER — APPOINTMENT (OUTPATIENT)
Dept: PHYSICAL THERAPY | Facility: CLINIC | Age: 39
End: 2024-02-06
Payer: COMMERCIAL

## 2024-02-08 ENCOUNTER — APPOINTMENT (OUTPATIENT)
Dept: PHYSICAL THERAPY | Facility: CLINIC | Age: 39
End: 2024-02-08
Payer: COMMERCIAL

## 2024-02-13 ENCOUNTER — OFFICE VISIT (OUTPATIENT)
Dept: VASCULAR SURGERY | Facility: CLINIC | Age: 39
End: 2024-02-13
Payer: COMMERCIAL

## 2024-02-13 VITALS
DIASTOLIC BLOOD PRESSURE: 96 MMHG | WEIGHT: 153.2 LBS | BODY MASS INDEX: 24.04 KG/M2 | HEART RATE: 72 BPM | HEIGHT: 67 IN | SYSTOLIC BLOOD PRESSURE: 116 MMHG

## 2024-02-13 DIAGNOSIS — I87.2 CHRONIC VENOUS INSUFFICIENCY OF LOWER EXTREMITY: Primary | ICD-10-CM

## 2024-02-13 DIAGNOSIS — I83.93 SPIDER VEINS OF BOTH LOWER EXTREMITIES: ICD-10-CM

## 2024-02-13 PROCEDURE — 1036F TOBACCO NON-USER: CPT | Performed by: INTERNAL MEDICINE

## 2024-02-13 PROCEDURE — 99213 OFFICE O/P EST LOW 20 MIN: CPT | Performed by: INTERNAL MEDICINE

## 2024-02-13 RX ORDER — VIT C/E/ZN/COPPR/LUTEIN/ZEAXAN 250MG-90MG
1 CAPSULE ORAL DAILY
COMMUNITY

## 2024-02-13 NOTE — PROGRESS NOTES
Chief Complaints:  Seen for follow-up after detailed ultrasound evaluation.    HPI:  38 years old female with history of chronic venous insufficiency and was successfully treated came for a follow-up visit after she has had detailed ultrasound evaluation because of her symptoms in the legs.  Patient states since her last visit with me she has seen the PCP and she is going through physical therapy and the left leg symptoms are improving.  She is wearing the stockings but she says the over-the-counter stockings are not tight enough and when she wears the high compression she feels much better.  She has no significant swellings of the leg.    Rest of the review of systems no acute complaints.    ROS:  Respiratory:  No shortness of breath.  No cough, sinus congestion    Cardiovascular:    No chest pain.  No claudication.  No cyanosis.  Palpitations, denies.    Neurologic:    No tingling/Numbness.  Denies loss of strength.    Social History:  Tobacco Use:  None    Current Outpatient Medications on File Prior to Visit   Medication Sig Dispense Refill    albuterol 90 mcg/actuation inhaler Inhale 2 puffs every 4 hours if needed for wheezing.      Breztri Aerosphere 160-9-4.8 mcg/actuation HFA aerosol inhaler Inhale 2 puffs every 12 hours.      busPIRone (Buspar) 7.5 mg tablet TAKE ONE TABLET BY MOUTH THREE TIMES A DAY (MORNING, EVENING, AND BEFORE BEDTIME) 270 tablet 1    carbinoxamine maleate 4 mg tablet TAKE ONE TABLET BY MOUTH EVERY 12HRS      cholecalciferol (Vitamin D3) 25 MCG (1000 UT) capsule Take 1 capsule (25 mcg) by mouth once daily.      cyanocobalamin (Vitamin B-12) 100 mcg tablet Take 1 tablet (100 mcg) by mouth every other day.      Linzess 145 mcg capsule Take 1 capsule (145 mcg) by mouth once daily.      meloxicam (Mobic) 15 mg tablet TAKE 1 TABLET BY MOUTH EVERY DAY 30 tablet 1    tezepelumab-ekko (Tezspire) SubQ Pen Injector Inject 210 mg under the skin.      [DISCONTINUED] BACILLUS COAGULANS-INULIN ORAL  "Take 1 capsule by mouth once daily.       No current facility-administered medications on file prior to visit.        No Known Allergies     Examination:    Visit Vitals  BP (!) 116/96   Pulse 72   Ht 1.702 m (5' 7\")   Wt 69.5 kg (153 lb 3.2 oz)   BMI 23.99 kg/m²   Smoking Status Never   BSA 1.81 m²        Normal-built, well-nourished  with no apparent distress. Alert oriented  Skin:  Normal turgor.  No rash.  Head:  Normocephalic, atraumatic.  Eyes:  Pupils are equal, round,.  No pallor of conjunctivae.  Mucous membranes are moist.  Neck:  Supple.  No JVD.  No carotid bruit.  No thyromegaly. No cervical lymphadenopathy.  No clubbing  Chest:  Vesicular breathing Bilaterally good air entry.  No wheezing.  No crackles.  Heart:  Regular rate and rhythm.  S1, S2 positive.  No murmur.  Extremities:  Bilaterally no pedal pitting edema.  Bilaterally 2+ dorsalis pedis pulses.  No calf tenderness. Homans sign is negative.  Neuro Exam: No focal signs. Gait is normal.     Venous Exam:  Varicose veins in left calf [absent  Varicose veins in left thigh [absent  Varicose veins in right calf [absent  Varicose veins in right posterior thigh [present  Reticular veins in left calf [present  Reticular veins in left thigh [present  Reticular veins in right calf [present  Reticular veins in right thigh [present  Telangiectasias in left calf [present  Telangiectasias in left thigh [present  Telangiectasias in right calf [present].  Telangiectasias in right thigh [present].  Right leg edema [absent  Left leg edema [absent  Hyperpigmentation in left leg [absent  Hyperpigmentation in right leg [absent  Lipodermatosclerosis in right leg absent].  Lipodermatosclerosis in left leg [absent  Dermatitis in left leg [absent  Dermatitis in right leg [absent  Corona phlebectatica in left leg [absent  Corona phlebectatica in right leg [absent  Atrophe davida in left leg [absent  Atrophe davida in right leg [absent  Ulcer(s) in left leg " [absent  Ulcer(s) in right leg [absent        Bilateral CEAP C2rS         Diagnosis/ Problems    Assessment:    Problem List Items Addressed This Visit       Chronic venous insufficiency of lower extremity - Primary    Relevant Orders    Compression Stockings 30-40 mmHg    Spider veins of both lower extremities    Relevant Orders    Referral to Dermatology    Compression Stockings 30-40 mmHg          Plan   38 years old female who has had bilateral chronic venous insufficiency was successfully treated came for a follow-up visit after having detailed ultrasound evaluation.  Patient's detail ultrasound evaluation were reviewed.  She has reflux in the few superficial tributaries in the right leg which are measured less than 3 mm in diameter.  Left leg has no refluxing tributaries.  Patient's symptoms are atypical for chronic venous insufficiency and her exam shows she has reticular veins which very well could be symptomatic.  But her symptoms from the left leg is better with the treatment for musculoskeletal origin.  At this time patient is successfully treated for her symptomatic chronic venous insufficiency and the reticular veins and spider veins needs cosmetic treatment.  The referrals were given to the patient and patient will call the dermatologist office to have the cosmetic treatment arranged.  If patient is doing well with the lifestyle modification and wearing the graduated compression stocking she will follow-up with me in 1 year time.

## 2024-02-23 ENCOUNTER — APPOINTMENT (OUTPATIENT)
Dept: PHYSICAL THERAPY | Facility: CLINIC | Age: 39
End: 2024-02-23
Payer: COMMERCIAL

## 2024-02-26 ENCOUNTER — OFFICE VISIT (OUTPATIENT)
Dept: ORTHOPEDIC SURGERY | Facility: CLINIC | Age: 39
End: 2024-02-26
Payer: COMMERCIAL

## 2024-02-26 DIAGNOSIS — S76.112D QUADRICEPS STRAIN, LEFT, SUBSEQUENT ENCOUNTER: Primary | ICD-10-CM

## 2024-02-26 PROCEDURE — 1036F TOBACCO NON-USER: CPT | Performed by: EMERGENCY MEDICINE

## 2024-02-26 PROCEDURE — 99214 OFFICE O/P EST MOD 30 MIN: CPT | Performed by: EMERGENCY MEDICINE

## 2024-02-26 NOTE — PROGRESS NOTES
Subjective    Patient ID: Eden Quispe is a 38 y.o. female.    Chief Complaint: Follow-up of the Left Thigh (Feeling better has gone to PT 3 times already and seems to be helping. )     Last Surgery: No surgery found  Last Surgery Date: No surgery found    Eden is a very pleasant 38-year-old female coming in to see me today for left thigh pain.  She was referred here by her PCP Dr. Barry.  She has been having atraumatic left thigh pain since the middle of October.  She had not been very active and had decided to try to start doing an at home exercise program within insanity class.  However after about a week of the classes she was having moderate pain in her thigh located mostly along the anterior medial aspect midway down the left thigh radiating down past the medial aspect of the left knee towards her calf.  Her pain is better now but she is still not very active because she does have pain with working out.  Her pain is also worse with sitting.  Here today it is extremely mild and dull rated 1 or 2 out of 10.  She recently saw her PCP who started her on meloxicam.  She has not yet started physical therapy.  She has a history of a vitamin B12 deficiency.  She has no back pain.  She has no hip pain or isolated knee pain.  She denies any known trauma or injuries. We agreed to obtain an x-ray of her left femur because of the location of her pain.  However I do believe that her presentation is most consistent with a strain to her left medial quad in the setting of very tight hamstrings.  We therefore also agreed to send her to physical therapy and for her to develop a home exercise program.  She will also continue taking her meloxicam and will follow-up with me in 4 to 6 weeks.  If she is not much better at that time we could potentially obtain an MRI or consider other options such as an EMG or PRP injections of the left quad.    Update on 2/26/2024.  Eden is coming back in for a follow-up visit.  Due to  scheduling issues she has only do about 3 formal sessions of physical therapy but has been doing some home exercises and activity modifications.  She initially was compliant with meloxicam but and off since last seeing me in the office.  Overall she tells me that she is 90% better.  She still gets intermittent mild symptoms mostly located over the left medial distal thigh area radiating down the medial aspect of the left knee towards the proximal calf area.  She tried walking every day last week for about 30 to 40 minutes and did have some mild symptoms return.  Her next PT session is next week.        Objective   Right Knee Exam   Right knee exam is normal.      Left Knee Exam     Muscle Strength   The patient has normal left knee strength.    Tenderness   The patient is experiencing no tenderness.     Range of Motion   The patient has normal left knee ROM.    Tests   Mina:  Medial - negative Lateral - negative  Varus: negative Valgus: negative  Lachman:  Anterior - negative      Drawer:  Anterior - negative     Posterior - negative    Other   Erythema: absent  Sensation: normal  Pulse: present  Swelling: none  Effusion: no effusion present    Comments:  Grossly normal exam that has improved            Image Results:  No new imaging today    Assessment/Plan   Encounter Diagnoses:  No diagnosis found.    No orders of the defined types were placed in this encounter.    No follow-ups on file.    We discussed her treatment options and agreed that overall she is doing much better.  She tells me that she is doing about 90% better.  She agreed to continue physical therapy with her next appointment being next week.  She continue her home exercises and stretching.  She will continue activity modifications but will start to reintroduce some of her exercising.  She will begin using prescription dose Voltaren on an as-needed basis over the areas that are affecting her.  She will follow-up with me as needed and knows to  come back in if her symptoms do not completely resolve or if her pain and symptoms worsen.  If that happens we would likely either obtain an MRI but my suspicion for surgical pathology at this time is extremely low.  She agreed to potentially even go see a chiropractor to have an adjustment performed.  In addition, this could potentially be chronic exertional compartment syndrome.  I think most likely but if she returns we could again discuss potentially referring her to Dr. Mar Taylor for compartment testing since she does get symptoms in the proximal calf with exertion that resolves with rest.  However, again right now she is saying that she is 90% better so I do not think that we need to pursue any of these options at this time.    ** Please excuse any errors in grammar or translation related to this dictation. Voice recognition software was utilized to prepare this document. **       Javy Levy MD  [facility]

## 2024-02-27 ENCOUNTER — APPOINTMENT (OUTPATIENT)
Dept: PHYSICAL THERAPY | Facility: CLINIC | Age: 39
End: 2024-02-27
Payer: COMMERCIAL

## 2024-03-05 ENCOUNTER — APPOINTMENT (OUTPATIENT)
Dept: PRIMARY CARE | Facility: CLINIC | Age: 39
End: 2024-03-05
Payer: COMMERCIAL

## 2024-03-05 ENCOUNTER — OFFICE VISIT (OUTPATIENT)
Dept: ALLERGY | Facility: CLINIC | Age: 39
End: 2024-03-05
Payer: COMMERCIAL

## 2024-03-05 VITALS
OXYGEN SATURATION: 99 % | TEMPERATURE: 98.6 F | HEIGHT: 67 IN | RESPIRATION RATE: 17 BRPM | SYSTOLIC BLOOD PRESSURE: 106 MMHG | WEIGHT: 151 LBS | BODY MASS INDEX: 23.7 KG/M2 | DIASTOLIC BLOOD PRESSURE: 74 MMHG | HEART RATE: 89 BPM

## 2024-03-05 DIAGNOSIS — J45.51 SEVERE PERSISTENT ASTHMA WITH ACUTE EXACERBATION (MULTI): Primary | ICD-10-CM

## 2024-03-05 PROCEDURE — 1036F TOBACCO NON-USER: CPT | Performed by: ALLERGY & IMMUNOLOGY

## 2024-03-05 PROCEDURE — 94640 AIRWAY INHALATION TREATMENT: CPT | Performed by: ALLERGY & IMMUNOLOGY

## 2024-03-05 PROCEDURE — 99213 OFFICE O/P EST LOW 20 MIN: CPT | Performed by: ALLERGY & IMMUNOLOGY

## 2024-03-05 RX ORDER — IPRATROPIUM BROMIDE AND ALBUTEROL SULFATE 2.5; .5 MG/3ML; MG/3ML
3 SOLUTION RESPIRATORY (INHALATION)
Qty: 180 ML | Refills: 11 | Status: SHIPPED | OUTPATIENT
Start: 2024-03-05 | End: 2025-03-05

## 2024-03-05 RX ORDER — PREDNISONE 20 MG/1
20 TABLET ORAL 2 TIMES DAILY
Qty: 10 TABLET | Refills: 0 | Status: SHIPPED | OUTPATIENT
Start: 2024-03-05 | End: 2024-03-10

## 2024-03-05 RX ORDER — IPRATROPIUM BROMIDE AND ALBUTEROL SULFATE 2.5; .5 MG/3ML; MG/3ML
3 SOLUTION RESPIRATORY (INHALATION) ONCE
Status: COMPLETED | OUTPATIENT
Start: 2024-03-05 | End: 2024-03-05

## 2024-03-05 RX ADMIN — IPRATROPIUM BROMIDE AND ALBUTEROL SULFATE 3 ML: 2.5; .5 SOLUTION RESPIRATORY (INHALATION) at 14:20

## 2024-03-05 NOTE — PROGRESS NOTES
prePatient ID: Eden Quispe is a 38 y.o. female.     Chief Complaint: follow up for sick visit  History Of Present Illness  Eden Quispe is a 38 y.o. female with PMx asthma presenting for sick follow up.   End of January felt like she had a cold. Son was also sick.  Then her daughter had strep.  By this time patient had increased symptoms, but then more cough.  Went to urgent care. Was given Amoxicillin for a week and Prednisone for 5 days.  She ended up having diarrhea.  Felt a little better, but then has started to cough again.    She had never had coughing that kept her awake at night, which she did have with this illness.  She did continue Breztri and Albuterol.  She is on Tezspire and continues to do some at home dosing wihtout side effects. She has an AirSupra inhaler which she uses as rescue inhaler intermittently. She has been sharing her son's nebulizer tubing and medication.    Today has started to feel better.  She has also taken Mucinex which has helped clear and thin the mucous.      Food Allergy  No    Eczema/ Atopic Dermatitis  No    Asthma  As noted    Rhinoconjunctivitis  yes    Drug Allergy   nkda    Insect Allergy   No    Infections  No history of frequent or recurrent infections      Review of Systems    Pertinent positives and negatives have been assessed in the HPI. All other systems have been reviewed and are negative except as noted in the HPI.    Allergies  Patient has no known allergies.    Past Medical History  She has a past medical history of Allergic.    Family History  Family History   Problem Relation Name Age of Onset    No Known Problems Mother      No Known Problems Father         Surgical History  She has a past surgical history that includes Other surgical history (08/08/2019); Other surgical history (08/08/2019); and Pelvic laparoscopy.    Social/Environmental History  She reports that she has never smoked. She has never been exposed to tobacco smoke. She has never used  "smokeless tobacco. She reports that she does not currently use alcohol. She reports that she does not use drugs.        MEDICATIONS  Current Outpatient Medications on File Prior to Visit   Medication Sig Dispense Refill    albuterol 90 mcg/actuation inhaler Inhale 2 puffs every 4 hours if needed for wheezing.      Breztri Aerosphere 160-9-4.8 mcg/actuation HFA aerosol inhaler Inhale 2 puffs every 12 hours.      busPIRone (Buspar) 7.5 mg tablet TAKE ONE TABLET BY MOUTH THREE TIMES A DAY (MORNING, EVENING, AND BEFORE BEDTIME) 270 tablet 1    carbinoxamine maleate 4 mg tablet TAKE ONE TABLET BY MOUTH EVERY 12HRS      cholecalciferol (Vitamin D3) 25 MCG (1000 UT) capsule Take 1 capsule (25 mcg) by mouth once daily.      cyanocobalamin (Vitamin B-12) 100 mcg tablet Take 1 tablet (100 mcg) by mouth every other day.      Linzess 145 mcg capsule Take 1 capsule (145 mcg) by mouth once daily.      tezepelumab-ekko (Tezspire) SubQ Pen Injector Inject 210 mg under the skin.      meloxicam (Mobic) 15 mg tablet TAKE 1 TABLET BY MOUTH EVERY DAY (Patient not taking: Reported on 2/26/2024) 30 tablet 1     No current facility-administered medications on file prior to visit.     Physical Exam  Visit Vitals  /74   Pulse 89   Temp 37 °C (98.6 °F) (Temporal)   Resp 17   Ht 1.702 m (5' 7\")   Wt 68.5 kg (151 lb)   SpO2 99%   BMI 23.65 kg/m²   Smoking Status Never   BSA 1.8 m²       Wt Readings from Last 1 Encounters:   03/05/24 68.5 kg (151 lb)       Physical Exam    General: Well appearing, no acute distress, raspy voice  Head: Normocephalic, atraumatic, neck supple with shotty cvl lymphadenopathy  Eyes: EOMI, non-injected  Nose: No nasal crease, nares patent, normal turbinates, minimal discharge  Throat: Normal dentition, no erythema  Heart: Regular rate and rhythm  Lungs: dry cough, wheezing throughout. Completely cleared post duoneb.  Abdomen: Soft, non-tender, normal bowel sounds  Extremities: Moves all extremities " symmetrically, no edema  Skin: No rashes/lesions  Psych: normal mood and affect    LAB RESULTS:  CBC:  Recent Labs     12/20/22  1543 09/23/22  1347 03/15/22  1402   WBC 5.8 6.1 6.2   HGB 12.9 12.7 13.0   HCT 39.7 39.4 40.9    181 164   MCV 84 87 88   EOSABS 0.09 0.08 0.22     CMP:  Recent Labs     12/20/22  1543 09/23/22  1347 06/24/20  1459    138 139   K 3.9 3.8 4.5    102 102   CO2 28 26 27   ANIONGAP 11 14 15   BUN 15 14 13   CREATININE 0.94 0.91 0.92     Recent Labs     12/20/22  1543 09/23/22  1347 06/24/20  1459   ALBUMIN 4.4 4.5 4.7   ALKPHOS 60 48 74   ALT 13 12 15   AST 16 17 17   BILITOT 0.3 0.5 0.4         Recent Labs     12/20/22  1543 09/23/22  1347 03/15/22  1402   EOSABS 0.09 0.08 0.22       IMMUNO:   Recent Labs     03/15/22  1402   IGG 1,070      IGM 86         HEME/ENDO:  Recent Labs     06/24/20  1459 09/23/19  0946   TSH 1.57 0.83         Assessment/Plan   Assessment:  37 yo woman with severe persistent asthma, exacerbated by illness with multiple sick contacts.  -reviewed good response to duoneb.  -continue at home with own personal tubing, not to be shared.  -Review of previous action plan and continue Tezspire per routine  -On hand steroids for worsening, but can defer today due to excellent response to duoneb.    Plan:  Planned follow up May 7. Sooner if needed.      Leonela Simon,

## 2024-03-06 ENCOUNTER — APPOINTMENT (OUTPATIENT)
Dept: OBSTETRICS AND GYNECOLOGY | Facility: CLINIC | Age: 39
End: 2024-03-06
Payer: COMMERCIAL

## 2024-03-06 ENCOUNTER — TREATMENT (OUTPATIENT)
Dept: PHYSICAL THERAPY | Facility: CLINIC | Age: 39
End: 2024-03-06
Payer: COMMERCIAL

## 2024-03-06 DIAGNOSIS — M79.652 MUSCULOSKELETAL PAIN OF LEFT THIGH: ICD-10-CM

## 2024-03-06 DIAGNOSIS — S76.112A QUADRICEPS STRAIN, LEFT, INITIAL ENCOUNTER: ICD-10-CM

## 2024-03-06 PROCEDURE — 97110 THERAPEUTIC EXERCISES: CPT | Mod: GP | Performed by: PHYSICAL THERAPIST

## 2024-03-06 PROCEDURE — 97140 MANUAL THERAPY 1/> REGIONS: CPT | Mod: GP | Performed by: PHYSICAL THERAPIST

## 2024-03-06 ASSESSMENT — PAIN - FUNCTIONAL ASSESSMENT: PAIN_FUNCTIONAL_ASSESSMENT: 0-10

## 2024-03-06 ASSESSMENT — PAIN SCALES - GENERAL: PAINLEVEL_OUTOF10: 5 - MODERATE PAIN

## 2024-03-06 NOTE — PROGRESS NOTES
Physical Therapy    Physical Therapy Treatment    Patient Name: Eden Quispe  MRN: [id number]  Today's Date: 3/6/2024  Time Calculation  Start Time: 1715  Stop Time: 1800  Time Calculation (min): 45 min    Insurance:  Visit:  3 of 40  Auth required: No  Payor: [employer] / Plan: [employer] / Product Type: *No Product type* /    Assessment:   Pt reported relief after MT. Continues to demonstrate good understanding of glute activation. Added lateral step to HEP and patient demonstrated good understanding. Educated patient on massage at home.    Plan:   Progress hip strengthening as tolerated. Consider TPN to calf if symptoms persist.     Current Problem  1. Quadriceps strain, left, initial encounter  Follow Up In Physical Therapy      2. Musculoskeletal pain of left thigh  Follow Up In Physical Therapy        General   General  Reason for Referral: Left quadriceps strain  Referred By: Dr. Levy    Subjective    She states that pain was starting to get better, but worsened over the past week. Last week she started to walk for 30 mins/day which may have exacerbated pain by the end of the week.     Precautions  Precautions  Medical Precautions: No known precautions/limitation (Medical hx reviewed. Not likely to impact care.)    Pain  Pain Assessment  Pain Assessment: 0-10  Pain Score: 5 - Moderate pain  Pain Location: Leg  Pain Orientation: Left    Objective   TTP L adductor and gastroc    Treatments:  THERAPEUTIC EXERCISE:  Recumbent bike random level 1 6 mins  Active sup HS stretch in 90/90 10x each   Bridges 10x  Clamshell 10x each   Quadruped extension kicks 10x each  Side lunge adductor stretch 30 sec x2 each   Lateral step with GTB 10x2 each      MANUAL THERAPY:  [place] tail to L HS, adductors, and gastroc    NEUROMUSCULAR RE-EDUCATION:    THERAPEUTIC ACTIVITY:    MODALITIES:      OP EDUCATION:   Access Code: [email or web address]  URL: [email or web address]  Date:  01/30/2024  Prepared by: Leatha Noonan    Exercises  - Supine Bridge  - 2 x daily - 7 x weekly - 1 sets - 10 reps  - Clamshell  - 2 x daily - 7 x weekly - 1 sets - 10 reps  - Sidelying Hip Abduction  - 2 x daily - 7 x weekly - 1 sets - 10 reps  - Gastroc Stretch on Step  - 2 x daily - 7 x weekly - 1 sets - 10 reps  - Side Lunge Adductor Stretch  - 2 x daily - 7 x weekly - 1 sets - 10 reps  - Single Leg Balance with Clock Reach  - 2 x daily - 7 x weekly - 1 sets - 10 reps  - Seated Hamstring Stretch  - 2 x daily - 7 x weekly - 1 sets - 10 reps  - Quadruped Hip Extension Kicks  - 2 x daily - 7 x weekly - 1 sets - 10 reps    Goals:  1. Pain 0/10  2. Outcomes Measure:  LEFS 80/80  3. Increase hip extension/abduction to 5/5 to allow patient to resume impact loading w/o increased symptoms  4. Demonstrates independence with HEP.     Care was provided for this patient by Ml Howard, SPT

## 2024-03-11 DIAGNOSIS — J45.51 SEVERE PERSISTENT ASTHMA WITH ACUTE EXACERBATION (MULTI): Primary | ICD-10-CM

## 2024-03-11 RX ORDER — PREDNISONE 20 MG/1
20 TABLET ORAL DAILY
Qty: 7 TABLET | Refills: 1 | Status: SHIPPED | OUTPATIENT
Start: 2024-03-11 | End: 2024-03-25

## 2024-03-11 RX ORDER — AZITHROMYCIN 250 MG/1
TABLET, FILM COATED ORAL
Qty: 6 TABLET | Refills: 0 | Status: SHIPPED | OUTPATIENT
Start: 2024-03-11 | End: 2024-04-09 | Stop reason: ALTCHOICE

## 2024-03-13 ENCOUNTER — TREATMENT (OUTPATIENT)
Dept: PHYSICAL THERAPY | Facility: CLINIC | Age: 39
End: 2024-03-13
Payer: COMMERCIAL

## 2024-03-13 DIAGNOSIS — M79.652 MUSCULOSKELETAL PAIN OF LEFT THIGH: ICD-10-CM

## 2024-03-13 DIAGNOSIS — S76.112A QUADRICEPS STRAIN, LEFT, INITIAL ENCOUNTER: ICD-10-CM

## 2024-03-13 PROCEDURE — 97110 THERAPEUTIC EXERCISES: CPT | Mod: GP | Performed by: PHYSICAL THERAPIST

## 2024-03-13 ASSESSMENT — PAIN SCALES - GENERAL: PAINLEVEL_OUTOF10: 2

## 2024-03-13 ASSESSMENT — PAIN - FUNCTIONAL ASSESSMENT: PAIN_FUNCTIONAL_ASSESSMENT: 0-10

## 2024-03-13 NOTE — PROGRESS NOTES
"Physical Therapy    Physical Therapy Treatment    Patient Name: Eden Quispe  MRN: [id number]  Today's Date: 3/13/2024  Time Calculation  Start Time: 1637  Stop Time: 1720  Time Calculation (min): 43 min    Insurance:  Visit:  4 of 40  Auth required: No  Payor: [employer] / Plan: [employer] / Product Type: *No Product type* /    Assessment:   Able to progress LE strengthening without increased sx.  Difficulty recruiting glutes with leg press.    Plan:   Continue to progress strengthening as tolerated.    Current Problem  1. Quadriceps strain, left, initial encounter  Follow Up In Physical Therapy      2. Musculoskeletal pain of left thigh  Follow Up In Physical Therapy          General   General  Reason for Referral: Left quadriceps strain  Referred By: Dr. Levy    Subjective    Reports significant relief since last visit.  Feels like HS stretch and side stepping have been helpful.    Precautions  Precautions  Medical Precautions: No known precautions/limitation (Medical hx reviewed. Not likely to impact care.)    Pain  Pain Assessment  Pain Assessment: 0-10  Pain Score: 2  Pain Location: Leg  Pain Orientation: Left    Objective   No TTP noted    Treatments:  THERAPEUTIC EXERCISE:  Recumbent bike random level 1 6 mins  Active sup HS stretch in 90/90 10x each   Bridges 10x  Clamshell 10x each   Bird dog 5\" x8 ea  TG L5 + 20# B press 10x2 with GTB  TG L3 SL press 10x2 ea  Modified SL DL with 5# KB 10x ea    MANUAL THERAPY:      NEUROMUSCULAR RE-EDUCATION:      THERAPEUTIC ACTIVITY:      MODALITIES:      OP EDUCATION:   Access Code: [email or web address]  URL: [email or web address]  Date: 03/13/2024  Prepared by: Leatha Noonan    Exercises  - Supine Bridge  - 2 x daily - 7 x weekly - 1 sets - 10 reps  - Clamshell  - 2 x daily - 7 x weekly - 1 sets - 10 reps  - Sidelying Hip Abduction  - 2 x daily - 7 x weekly - 1 sets - 10 reps  - Gastroc Stretch on Step  - 2 x daily - 7 x " weekly - 1 sets - 10 reps  - Side Lunge Adductor Stretch  - 2 x daily - 7 x weekly - 1 sets - 10 reps  - Single Leg Balance with Clock Reach  - 2 x daily - 7 x weekly - 1 sets - 10 reps  - Seated Hamstring Stretch  - 2 x daily - 7 x weekly - 1 sets - 10 reps  - Quadruped Hip Extension Kicks  - 2 x daily - 7 x weekly - 1 sets - 10 reps  - Hooklying Active Hamstring Stretch  - 2 x daily - 7 x weekly - 1 sets - 10 reps  - Side Stepping with Resistance at Feet  - 2 x daily - 7 x weekly - 1 sets - 10 reps  - Bird Dog  - 2 x daily - 7 x weekly - 1 sets - 10 reps - 5 hold  - Single Leg Deadlift with Kettlebell  - 2 x daily - 7 x weekly - 1 sets - 10 reps    Goals:  1. Pain 0/10  2. Outcomes Measure:  LEFS 80/80  3. Increase hip extension/abduction to 5/5 to allow patient to resume impact loading w/o increased symptoms  4. Demonstrates independence with HEP.

## 2024-03-14 ENCOUNTER — APPOINTMENT (OUTPATIENT)
Dept: OBSTETRICS AND GYNECOLOGY | Facility: CLINIC | Age: 39
End: 2024-03-14
Payer: COMMERCIAL

## 2024-04-03 ENCOUNTER — CLINICAL SUPPORT (OUTPATIENT)
Dept: ALLERGY | Facility: CLINIC | Age: 39
End: 2024-04-03
Payer: COMMERCIAL

## 2024-04-03 DIAGNOSIS — J45.51 SEVERE PERSISTENT ASTHMA WITH ACUTE EXACERBATION (MULTI): ICD-10-CM

## 2024-04-03 PROCEDURE — 96372 THER/PROPH/DIAG INJ SC/IM: CPT | Performed by: ALLERGY & IMMUNOLOGY

## 2024-04-03 NOTE — PROGRESS NOTES
Patient here for Tezspire injection           Area cleansed with alcohol.  Subcutaneous injection performed in clean fashion. Patient tolerated well without adverse reaction.

## 2024-04-09 ENCOUNTER — OFFICE VISIT (OUTPATIENT)
Dept: PRIMARY CARE | Facility: CLINIC | Age: 39
End: 2024-04-09
Payer: COMMERCIAL

## 2024-04-09 VITALS
HEART RATE: 75 BPM | DIASTOLIC BLOOD PRESSURE: 75 MMHG | WEIGHT: 154 LBS | RESPIRATION RATE: 16 BRPM | SYSTOLIC BLOOD PRESSURE: 116 MMHG | BODY MASS INDEX: 24.17 KG/M2 | TEMPERATURE: 98.4 F | OXYGEN SATURATION: 98 % | HEIGHT: 67 IN

## 2024-04-09 DIAGNOSIS — I87.2 CHRONIC VENOUS INSUFFICIENCY OF LOWER EXTREMITY: ICD-10-CM

## 2024-04-09 DIAGNOSIS — M25.552 PAIN OF LEFT HIP: ICD-10-CM

## 2024-04-09 DIAGNOSIS — Z78.9 HEALTH MAINTENANCE ALTERATION: ICD-10-CM

## 2024-04-09 DIAGNOSIS — K59.09 CHRONIC CONSTIPATION: Primary | ICD-10-CM

## 2024-04-09 DIAGNOSIS — S76.112A QUADRICEPS STRAIN, LEFT, INITIAL ENCOUNTER: ICD-10-CM

## 2024-04-09 DIAGNOSIS — Q23.1 CONGENITAL INSUFFICIENCY OF AORTIC VALVE (HHS-HCC): ICD-10-CM

## 2024-04-09 PROBLEM — K21.9 LARYNGOPHARYNGEAL REFLUX (LPR): Status: ACTIVE | Noted: 2024-04-09

## 2024-04-09 PROBLEM — R92.8 ABNORMAL MAMMOGRAM: Status: ACTIVE | Noted: 2024-04-09

## 2024-04-09 PROBLEM — N93.9 ABNORMAL UTERINE BLEEDING: Status: ACTIVE | Noted: 2024-04-09

## 2024-04-09 RX ORDER — MELOXICAM 15 MG/1
15 TABLET ORAL DAILY
Qty: 30 TABLET | Refills: 1 | Status: SHIPPED | OUTPATIENT
Start: 2024-04-09

## 2024-04-09 ASSESSMENT — ENCOUNTER SYMPTOMS
APNEA: 0
CHOKING: 0
WHEEZING: 0
CONSTIPATION: 0
COUGH: 0
STRIDOR: 0
SHORTNESS OF BREATH: 0
PALPITATIONS: 0
CHEST TIGHTNESS: 0

## 2024-04-09 NOTE — ASSESSMENT & PLAN NOTE
Actively in physical therapy for it right now- sports medicine- Dr. Levy wants to continue if no improvement may do MRI has been since October.

## 2024-04-09 NOTE — PROGRESS NOTES
Subjective   Eden Quispe is a 39 y.o. female who presents for Establish Care.  Patient is here to Establish Care. Patient stated would like to have blood work done, since is turning 39.  Pt. Is transferring from Dr. Barry.  Sees GYN Dr Tracey Posadas-rescheduled every time she was going to see her next year sees new OB. PAP due 2025.     Patient feels like she is having a hard time losing weight, she was always a runner always in great shape and then the pain started with program insanity-[facility] t- went back into it and it became harder.   Patient has not met with nutritionist.     Next PT Is Thursday last session was march 13-14 she is seeing them for hamstring and glute weakness.     Also wants to discuss weight loss. Patients ideal weight is 135 lbs . Since she hurt her leg activity level has decreased.     Wants to wait for labs to come back before pursuing nutritionist.   Salads, fruit, bagels, sometimes candy, will have a bag of chips for lunch on a  regular day        Review of Systems   Constitutional:  Positive for unexpected weight change (weight gain).   HENT:          Patient had a terrible cough related to asthma that improved with the Tezspire usage   Respiratory:  Negative for apnea, cough, choking, chest tightness, shortness of breath, wheezing and stridor.         Reports severe asthma patient was on 3 rounds of steroids and abx as well tezspire helps her.    Cardiovascular:  Negative for chest pain, palpitations and leg swelling.   Gastrointestinal:  Negative for constipation.   Musculoskeletal:  Positive for myalgias (let weakness in hamstring muscle and glute).        Left leg hamstring/thigh/pain weakness worse with extensive walking    All other systems reviewed and are negative.      Objective   Physical Exam  Vitals reviewed.   HENT:      Head: Normocephalic.   Cardiovascular:      Rate and Rhythm: Normal rate and regular rhythm.      Pulses: Normal pulses.      Heart sounds: Normal  "heart sounds.   Pulmonary:      Effort: Pulmonary effort is normal.      Breath sounds: Normal breath sounds.   Neurological:      General: No focal deficit present.      Mental Status: She is alert and oriented to person, place, and time. Mental status is at baseline.       /75 (BP Location: Left arm, Patient Position: Sitting)   Pulse 75   Temp 36.9 °C (98.4 °F)   Resp 16   Ht 1.702 m (5' 7\")   Wt 69.9 kg (154 lb)   LMP 04/02/2024   SpO2 98%   BMI 24.12 kg/m²       Assessment/Plan   Problem List Items Addressed This Visit       Chronic constipation - Primary     Controlled on Linzess         Chronic venous insufficiency of lower extremity     Dr Bob every 6 months to a year- intravenous vein therapy done x 2. 1 month ago sclerotherapy through dermatology.          Quadriceps strain, left, initial encounter     Actively in physical therapy for it right now- sports medicine- Dr. Levy wants to continue if no improvement may do MRI has been since October.          Aortic regurgitation, congenital     Resolved sees cardio once every 2 years          Other Visit Diagnoses       Health maintenance alteration        Relevant Orders    Tsh With Reflex To Free T4 If Abnormal    Comprehensive metabolic panel    Lipid panel    CBC    Magnesium    Vitamin D 25-Hydroxy,Total (for eval of Vitamin D levels)    Vitamin B12    Hemoglobin A1C    Pain of left hip        Relevant Medications    meloxicam (Mobic) 15 mg tablet            "

## 2024-04-09 NOTE — ASSESSMENT & PLAN NOTE
Dr Bob every 6 months to a year- intravenous vein therapy done x 2. 1 month ago sclerotherapy through dermatology.

## 2024-04-09 NOTE — PATIENT INSTRUCTIONS
Follow up on prevnar 20.  Will message Vale to see what he thinks about follow up or further studies.

## 2024-04-11 ENCOUNTER — TREATMENT (OUTPATIENT)
Dept: PHYSICAL THERAPY | Facility: CLINIC | Age: 39
End: 2024-04-11
Payer: COMMERCIAL

## 2024-04-11 DIAGNOSIS — S76.112A QUADRICEPS STRAIN, LEFT, INITIAL ENCOUNTER: ICD-10-CM

## 2024-04-11 DIAGNOSIS — M79.652 MUSCULOSKELETAL PAIN OF LEFT THIGH: ICD-10-CM

## 2024-04-11 PROCEDURE — 97140 MANUAL THERAPY 1/> REGIONS: CPT | Mod: GP | Performed by: PHYSICAL THERAPIST

## 2024-04-11 PROCEDURE — 97535 SELF CARE MNGMENT TRAINING: CPT | Mod: GP | Performed by: PHYSICAL THERAPIST

## 2024-04-11 PROCEDURE — 97110 THERAPEUTIC EXERCISES: CPT | Mod: GP | Performed by: PHYSICAL THERAPIST

## 2024-04-11 ASSESSMENT — PAIN - FUNCTIONAL ASSESSMENT: PAIN_FUNCTIONAL_ASSESSMENT: 0-10

## 2024-04-11 ASSESSMENT — ENCOUNTER SYMPTOMS
UNEXPECTED WEIGHT CHANGE: 1
MYALGIAS: 1

## 2024-04-11 ASSESSMENT — PAIN SCALES - GENERAL: PAINLEVEL_OUTOF10: 2

## 2024-04-11 NOTE — PROGRESS NOTES
Physical Therapy    Physical Therapy Treatment    Patient Name: Eden Quispe  MRN: [id number]  Today's Date: 4/11/2024  Time Calculation  Start Time: 1418  Stop Time: 1505  Time Calculation (min): 47 min    Insurance:  Visit:  5 of 40  Auth required: No  Payor: [employer] / Plan: [employer] / Product Type: *No Product type* /    Assessment:   Able to elicit twitch response to TPN today.  Verbalizes understanding of modifications to HEP.    Plan:   Continue to progress strengthening as tolerated.    Current Problem  1. Quadriceps strain, left, initial encounter  Follow Up In Physical Therapy      2. Musculoskeletal pain of left thigh  Follow Up In Physical Therapy          General   General  Reason for Referral: Left quadriceps strain  Referred By: Dr. Levy    Subjective    Has had more flare ups again.  States she is back to walking on TM 4% grade at 4.0mph for 30mins and feels fine doing it but then will have pain the next two days.  Sx aren't debilitating but they are definitely still present with activity.    Precautions  Precautions  Medical Precautions: No known precautions/limitation (Medical hx reviewed. Not likely to impact care.)    Pain  Pain Assessment  Pain Assessment: 0-10  Pain Score: 2  Pain Location: Leg  Pain Orientation: Left    Objective   TTP R distal ADD and HS    Treatments:  THERAPEUTIC EXERCISE:  Review of HEP / modifications to walking program  Review of ADD and HS stretches    MANUAL THERAPY:  Patient was educated on risks and benefits associated with trigger point needling, what to expect, and post procedure protocol (i.e. increased water intake, increased stretching, use of heat, etc.).  Patient educated on s/s associated with pneumothorax and to go to ED if patient were to start experiencing these after being needled in thorax region.  Patient was also educated on modifications to HEP.  Verbal consent was received to proceed with treatment.  TPN: R  distal ADD in fig 4 with 50mm; R distal medial HS with 50mm    NEUROMUSCULAR RE-EDUCATION:      THERAPEUTIC ACTIVITY:      SELF-CARE:  Thorough review of recent sx and addressed questions/concerns.      OP EDUCATION:   Access Code: [email or web address]  URL: [email or web address]  Date: 03/13/2024  Prepared by: Leatha Noonan    Exercises  - Supine Bridge  - 2 x daily - 7 x weekly - 1 sets - 10 reps  - Clamshell  - 2 x daily - 7 x weekly - 1 sets - 10 reps  - Sidelying Hip Abduction  - 2 x daily - 7 x weekly - 1 sets - 10 reps  - Gastroc Stretch on Step  - 2 x daily - 7 x weekly - 1 sets - 10 reps  - Side Lunge Adductor Stretch  - 2 x daily - 7 x weekly - 1 sets - 10 reps  - Single Leg Balance with Clock Reach  - 2 x daily - 7 x weekly - 1 sets - 10 reps  - Seated Hamstring Stretch  - 2 x daily - 7 x weekly - 1 sets - 10 reps  - Quadruped Hip Extension Kicks  - 2 x daily - 7 x weekly - 1 sets - 10 reps  - Hooklying Active Hamstring Stretch  - 2 x daily - 7 x weekly - 1 sets - 10 reps  - Side Stepping with Resistance at Feet  - 2 x daily - 7 x weekly - 1 sets - 10 reps  - Bird Dog  - 2 x daily - 7 x weekly - 1 sets - 10 reps - 5 hold  - Single Leg Deadlift with Kettlebell  - 2 x daily - 7 x weekly - 1 sets - 10 reps    Goals:  1. Pain 0/10  2. Outcomes Measure:  LEFS 80/80  3. Increase hip extension/abduction to 5/5 to allow patient to resume impact loading w/o increased symptoms  4. Demonstrates independence with HEP.

## 2024-04-13 ENCOUNTER — LAB (OUTPATIENT)
Dept: LAB | Facility: LAB | Age: 39
End: 2024-04-13
Payer: COMMERCIAL

## 2024-04-13 DIAGNOSIS — Z78.9 HEALTH MAINTENANCE ALTERATION: ICD-10-CM

## 2024-04-13 LAB
25(OH)D3 SERPL-MCNC: 31 NG/ML (ref 30–100)
ALBUMIN SERPL BCP-MCNC: 4.6 G/DL (ref 3.4–5)
ALP SERPL-CCNC: 66 U/L (ref 33–110)
ALT SERPL W P-5'-P-CCNC: 22 U/L (ref 7–45)
ANION GAP SERPL CALC-SCNC: 13 MMOL/L (ref 10–20)
AST SERPL W P-5'-P-CCNC: 21 U/L (ref 9–39)
BILIRUB SERPL-MCNC: 0.4 MG/DL (ref 0–1.2)
BUN SERPL-MCNC: 16 MG/DL (ref 6–23)
CALCIUM SERPL-MCNC: 9.4 MG/DL (ref 8.6–10.3)
CHLORIDE SERPL-SCNC: 103 MMOL/L (ref 98–107)
CHOLEST SERPL-MCNC: 271 MG/DL (ref 0–199)
CHOLESTEROL/HDL RATIO: 2.3
CO2 SERPL-SCNC: 25 MMOL/L (ref 21–32)
CREAT SERPL-MCNC: 0.8 MG/DL (ref 0.5–1.05)
EGFRCR SERPLBLD CKD-EPI 2021: >90 ML/MIN/1.73M*2
ERYTHROCYTE [DISTWIDTH] IN BLOOD BY AUTOMATED COUNT: 12.8 % (ref 11.5–14.5)
EST. AVERAGE GLUCOSE BLD GHB EST-MCNC: 100 MG/DL
GLUCOSE SERPL-MCNC: 82 MG/DL (ref 74–99)
HBA1C MFR BLD: 5.1 %
HCT VFR BLD AUTO: 39.8 % (ref 36–46)
HDLC SERPL-MCNC: 117.6 MG/DL
HGB BLD-MCNC: 12.8 G/DL (ref 12–16)
LDLC SERPL CALC-MCNC: 141 MG/DL
MAGNESIUM SERPL-MCNC: 1.98 MG/DL (ref 1.6–2.4)
MCH RBC QN AUTO: 28.4 PG (ref 26–34)
MCHC RBC AUTO-ENTMCNC: 32.2 G/DL (ref 32–36)
MCV RBC AUTO: 88 FL (ref 80–100)
NON HDL CHOLESTEROL: 153 MG/DL (ref 0–149)
NRBC BLD-RTO: 0 /100 WBCS (ref 0–0)
PLATELET # BLD AUTO: 221 X10*3/UL (ref 150–450)
POTASSIUM SERPL-SCNC: 3.9 MMOL/L (ref 3.5–5.3)
PROT SERPL-MCNC: 7.1 G/DL (ref 6.4–8.2)
RBC # BLD AUTO: 4.51 X10*6/UL (ref 4–5.2)
SODIUM SERPL-SCNC: 137 MMOL/L (ref 136–145)
TRIGL SERPL-MCNC: 61 MG/DL (ref 0–149)
TSH SERPL-ACNC: 1.5 MIU/L (ref 0.44–3.98)
VIT B12 SERPL-MCNC: 346 PG/ML (ref 211–911)
VLDL: 12 MG/DL (ref 0–40)
WBC # BLD AUTO: 8.6 X10*3/UL (ref 4.4–11.3)

## 2024-04-13 PROCEDURE — 84443 ASSAY THYROID STIM HORMONE: CPT

## 2024-04-13 PROCEDURE — 83036 HEMOGLOBIN GLYCOSYLATED A1C: CPT

## 2024-04-13 PROCEDURE — 80061 LIPID PANEL: CPT

## 2024-04-13 PROCEDURE — 85027 COMPLETE CBC AUTOMATED: CPT

## 2024-04-13 PROCEDURE — 82306 VITAMIN D 25 HYDROXY: CPT

## 2024-04-13 PROCEDURE — 83735 ASSAY OF MAGNESIUM: CPT

## 2024-04-13 PROCEDURE — 80053 COMPREHEN METABOLIC PANEL: CPT

## 2024-04-13 PROCEDURE — 82607 VITAMIN B-12: CPT

## 2024-04-13 PROCEDURE — 36415 COLL VENOUS BLD VENIPUNCTURE: CPT

## 2024-04-16 ENCOUNTER — TREATMENT (OUTPATIENT)
Dept: PHYSICAL THERAPY | Facility: CLINIC | Age: 39
End: 2024-04-16
Payer: COMMERCIAL

## 2024-04-16 DIAGNOSIS — M79.652 MUSCULOSKELETAL PAIN OF LEFT THIGH: ICD-10-CM

## 2024-04-16 DIAGNOSIS — S76.112A QUADRICEPS STRAIN, LEFT, INITIAL ENCOUNTER: ICD-10-CM

## 2024-04-16 PROCEDURE — 97110 THERAPEUTIC EXERCISES: CPT | Mod: GP | Performed by: PHYSICAL THERAPIST

## 2024-04-16 PROCEDURE — 97140 MANUAL THERAPY 1/> REGIONS: CPT | Mod: GP | Performed by: PHYSICAL THERAPIST

## 2024-04-16 ASSESSMENT — PAIN - FUNCTIONAL ASSESSMENT: PAIN_FUNCTIONAL_ASSESSMENT: 0-10

## 2024-04-16 ASSESSMENT — PAIN SCALES - GENERAL: PAINLEVEL_OUTOF10: 0 - NO PAIN

## 2024-04-16 NOTE — PROGRESS NOTES
Physical Therapy    Physical Therapy Treatment    Patient Name: Eden Quispe  MRN: [id number]  Today's Date: 4/16/2024  Time Calculation  Start Time: 1406  Stop Time: 1455  Time Calculation (min): 49 min    Insurance:  Visit:  6 of 40  Auth required: No  Payor: [employer] / Plan: [employer] / Product Type: *No Product type* /    Assessment:   Able to initiate return to run on Alter G with minimal discomfort.  Able to elicit twitch response to TPN today.     Plan:   Continue to progress strengthening as tolerated. Follow up in 2wks when she returns from trip.    Current Problem  1. Quadriceps strain, left, initial encounter  Follow Up In Physical Therapy      2. Musculoskeletal pain of left thigh  Follow Up In Physical Therapy          General   General  Reason for Referral: Left quadriceps strain  Referred By: Dr. Levy    Subjective    States she noticed significant relief since TPN last visit.  Has dull ache in lower aspect of VMO.    Precautions  Precautions  Medical Precautions: No known precautions/limitation (Medical hx reviewed. Not likely to impact care.)    Pain  Pain Assessment  Pain Assessment: 0-10  Pain Score: 0 - No pain  Pain Location: Leg  Pain Orientation: Left    Objective   TTP R distal ADD    Treatments:  THERAPEUTIC EXERCISE:  Recumbent bike level 1 for 5mins  Staggered bridges 10x  SL ER 10x  SL ABD 10x  SL DL with 5# 10x  Alter G (L): walk run intervals at 85% BW and 6.0mph x3 for 2mins (metronome)    MANUAL THERAPY:  TPN L VMO with 50mm    NEUROMUSCULAR RE-EDUCATION:      THERAPEUTIC ACTIVITY:      SELF-CARE:      OP EDUCATION:   Access Code: [email or web address]  URL: [email or web address]  Date: 03/13/2024  Prepared by: Leatha Noonan    Exercises  - Supine Bridge  - 2 x daily - 7 x weekly - 1 sets - 10 reps  - Clamshell  - 2 x daily - 7 x weekly - 1 sets - 10 reps  - Sidelying Hip Abduction  - 2 x daily - 7 x weekly - 1 sets - 10 reps  - Gastroc  Stretch on Step  - 2 x daily - 7 x weekly - 1 sets - 10 reps  - Side Lunge Adductor Stretch  - 2 x daily - 7 x weekly - 1 sets - 10 reps  - Single Leg Balance with Clock Reach  - 2 x daily - 7 x weekly - 1 sets - 10 reps  - Seated Hamstring Stretch  - 2 x daily - 7 x weekly - 1 sets - 10 reps  - Quadruped Hip Extension Kicks  - 2 x daily - 7 x weekly - 1 sets - 10 reps  - Hooklying Active Hamstring Stretch  - 2 x daily - 7 x weekly - 1 sets - 10 reps  - Side Stepping with Resistance at Feet  - 2 x daily - 7 x weekly - 1 sets - 10 reps  - Bird Dog  - 2 x daily - 7 x weekly - 1 sets - 10 reps - 5 hold  - Single Leg Deadlift with Kettlebell  - 2 x daily - 7 x weekly - 1 sets - 10 reps    Goals:  1. Pain 0/10  2. Outcomes Measure:  LEFS 80/80  3. Increase hip extension/abduction to 5/5 to allow patient to resume impact loading w/o increased symptoms  4. Demonstrates independence with HEP.

## 2024-05-01 ENCOUNTER — CLINICAL SUPPORT (OUTPATIENT)
Dept: ALLERGY | Facility: CLINIC | Age: 39
End: 2024-05-01
Payer: COMMERCIAL

## 2024-05-01 DIAGNOSIS — J45.51 SEVERE PERSISTENT ASTHMA WITH ACUTE EXACERBATION (MULTI): ICD-10-CM

## 2024-05-01 PROCEDURE — 96372 THER/PROPH/DIAG INJ SC/IM: CPT | Performed by: ALLERGY & IMMUNOLOGY

## 2024-05-07 ENCOUNTER — OFFICE VISIT (OUTPATIENT)
Dept: ALLERGY | Facility: CLINIC | Age: 39
End: 2024-05-07
Payer: COMMERCIAL

## 2024-05-07 VITALS
HEIGHT: 67 IN | HEART RATE: 80 BPM | DIASTOLIC BLOOD PRESSURE: 70 MMHG | TEMPERATURE: 97.9 F | BODY MASS INDEX: 23.86 KG/M2 | OXYGEN SATURATION: 99 % | RESPIRATION RATE: 17 BRPM | SYSTOLIC BLOOD PRESSURE: 100 MMHG | WEIGHT: 152 LBS

## 2024-05-07 DIAGNOSIS — J30.1 SEASONAL ALLERGIC RHINITIS DUE TO POLLEN: Primary | ICD-10-CM

## 2024-05-07 PROCEDURE — 99213 OFFICE O/P EST LOW 20 MIN: CPT | Performed by: ALLERGY & IMMUNOLOGY

## 2024-05-07 PROCEDURE — 96160 PT-FOCUSED HLTH RISK ASSMT: CPT | Performed by: ALLERGY & IMMUNOLOGY

## 2024-05-07 RX ORDER — FLUTICASONE PROPIONATE 50 MCG
2 SPRAY, SUSPENSION (ML) NASAL DAILY
Qty: 16 G | Refills: 5 | Status: SHIPPED | OUTPATIENT
Start: 2024-05-07 | End: 2025-05-07

## 2024-05-07 NOTE — PROGRESS NOTES
Patient ID: Eden Quispe is a 39 y.o. female.     Chief Complaint: follow up illness  History Of Present Illness  Eden Quispe is a 39 y.o. female with PMx severe asthma presenting for follow up.     Nebulized  Therapies  Mucinex-done with this  Breztri and Airsupra    ACT 25    No Tezspire side effects.  Working well for her.  Dupixent was her last treatment failure.  Was still coughing was the issue. Now the cough is completely resolved with Tezspire.        Review of Systems    Pertinent positives and negatives have been assessed in the HPI. All other systems have been reviewed and are negative except as noted in the HPI.    Allergies  Patient has no known allergies.    Past Medical History  She has a past medical history of Allergic, Anxiety, and Asthma ([facility]).    Family History  Family History   Problem Relation Name Age of Onset    No Known Problems Mother      No Known Problems Father         No history of food allergy or atopic disease in the family.    Surgical History  She has a past surgical history that includes Other surgical history (08/08/2019); Other surgical history (08/08/2019); and Pelvic laparoscopy.    Social/Environmental History  She reports that she has never smoked. She has never been exposed to tobacco smoke. She has never used smokeless tobacco. She reports current alcohol use of about 2.0 standard drinks of alcohol per week. She reports that she does not use drugs.      MEDICATIONS  Current Outpatient Medications on File Prior to Visit   Medication Sig Dispense Refill    albuterol 90 mcg/actuation inhaler Inhale 2 puffs every 4 hours if needed for wheezing.      Breztri Aerosphere 160-9-4.8 mcg/actuation HFA aerosol inhaler Inhale 2 puffs every 12 hours.      busPIRone (Buspar) 7.5 mg tablet TAKE ONE TABLET BY MOUTH THREE TIMES A DAY (MORNING, EVENING, AND BEFORE BEDTIME) 270 tablet 1    carbinoxamine maleate 4 mg tablet TAKE ONE TABLET BY MOUTH EVERY 12HRS      cholecalciferol  "(Vitamin D3) 25 MCG (1000 UT) capsule Take 1 capsule (25 mcg) by mouth once daily.      cyanocobalamin (Vitamin B-12) 100 mcg tablet Take 1 tablet (100 mcg) by mouth every other day.      ipratropium-albuteroL (Duo-Neb) 0.5-2.5 mg/3 mL nebulizer solution Take 3 mL by nebulization every 6 hours. 180 mL 11    Linzess 145 mcg capsule Take 1 capsule (145 mcg) by mouth once daily.      meloxicam (Mobic) 15 mg tablet Take 1 tablet (15 mg) by mouth once daily. 30 tablet 1    tezepelumab-ekko (Tezspire) SubQ Pen Injector Inject 210 mg under the skin.       No current facility-administered medications on file prior to visit.         Physical Exam  Visit Vitals  /70   Pulse 80   Temp 36.6 °C (97.9 °F) (Temporal)   Resp 17   Ht 1.702 m (5' 7\")   Wt 68.9 kg (152 lb)   LMP 04/02/2024   SpO2 99%   BMI 23.81 kg/m²   OB Status Having periods   Smoking Status Never   BSA 1.8 m²       Wt Readings from Last 1 Encounters:   05/07/24 68.9 kg (152 lb)       Physical Exam    General: Well appearing, no acute distress  Head: Normocephalic, atraumatic, neck supple without lymphadenopathy  Eyes: EOMI, non-injected  Ears: Tm's nml  Nose: No nasal crease, nares patent, slightly boggy turbinates, minimal discharge  Throat: Normal dentition, no erythema  Heart: Regular rate and rhythm  Lungs: Clear to auscultation bilaterally, effort normal  Abdomen: Soft, non-tender, normal bowel sounds  Extremities: Moves all extremities symmetrically, no edema  Skin: No rashes/lesions  Psych: normal mood and affect    LAB RESULTS:  CBC:  Recent Labs     04/13/24  0928 12/20/22  1543 09/23/22  1347 03/15/22  1402   WBC 8.6 5.8 6.1 6.2   HGB 12.8 12.9 12.7 13.0   HCT 39.8 39.7 39.4 40.9    157 181 164   MCV 88 84 87 88   EOSABS  --  0.09 0.08 0.22       CMP:  Recent Labs     04/13/24  0928 12/20/22  1543 09/23/22  1347    137 138   K 3.9 3.9 3.8    102 102   CO2 25 28 26   ANIONGAP 13 11 14   BUN 16 15 14   CREATININE 0.80 0.94 0.91 "   EGFR >90  --   --    MG 1.98  --   --      Recent Labs     04/13/24  0928 12/20/22  1543 09/23/22  1347   ALBUMIN 4.6 4.4 4.5   ALKPHOS 66 60 48   ALT 22 13 12   AST 21 16 17   BILITOT 0.4 0.3 0.5         Recent Labs     12/20/22  1543 09/23/22  1347 03/15/22  1402   EOSABS 0.09 0.08 0.22         IMMUNO:   Recent Labs     03/15/22  1402   IGG 1,070      IGM 86       HEME/ENDO:  Recent Labs     04/13/24  0928 06/24/20  1459 09/23/19  0946   TSH 1.50 1.57 0.83   HGBA1C 5.1  --   --      ACT 25    Assessment/Plan   Eden is a 40 yo with history of severe asthma s/p exacerbation, now well controlled and feeling better.  Continue Breztri and Tezspire.  AirSupra rescue.  Follow up August.      Leonela Simon DO

## 2024-05-08 ENCOUNTER — APPOINTMENT (OUTPATIENT)
Dept: PHYSICAL THERAPY | Facility: CLINIC | Age: 39
End: 2024-05-08
Payer: COMMERCIAL

## 2024-05-10 ENCOUNTER — APPOINTMENT (OUTPATIENT)
Dept: PRIMARY CARE | Facility: CLINIC | Age: 39
End: 2024-05-10
Payer: COMMERCIAL

## 2024-05-14 ENCOUNTER — TREATMENT (OUTPATIENT)
Dept: PHYSICAL THERAPY | Facility: CLINIC | Age: 39
End: 2024-05-14
Payer: COMMERCIAL

## 2024-05-14 DIAGNOSIS — M79.652 MUSCULOSKELETAL PAIN OF LEFT THIGH: ICD-10-CM

## 2024-05-14 DIAGNOSIS — S76.112A QUADRICEPS STRAIN, LEFT, INITIAL ENCOUNTER: ICD-10-CM

## 2024-05-14 PROCEDURE — 97110 THERAPEUTIC EXERCISES: CPT | Mod: GP | Performed by: PHYSICAL THERAPIST

## 2024-05-14 ASSESSMENT — PAIN SCALES - GENERAL: PAINLEVEL_OUTOF10: 0 - NO PAIN

## 2024-05-14 ASSESSMENT — PAIN - FUNCTIONAL ASSESSMENT: PAIN_FUNCTIONAL_ASSESSMENT: 0-10

## 2024-05-14 NOTE — PROGRESS NOTES
"Physical Therapy    Physical Therapy Treatment    Patient Name: Eden Quispe  MRN: [id number]  Today's Date: 5/14/2024  Time Calculation  Start Time: 1420  Stop Time: 1505  Time Calculation (min): 45 min    Insurance:  Visit:  7 of 40  Auth required: No  Payor: [employer] / Plan: [employer] / Product Type: *No Product type* /    Assessment:  Verbalizes understanding of running program moving fwd.  Demonstrates understanding of HEP.    Plan:  Hold to trial return to run at home and HEP for 1mo.  Discharge if no PT needed.    Current Problem  1. Quadriceps strain, left, initial encounter  Follow Up In Physical Therapy      2. Musculoskeletal pain of left thigh  Follow Up In Physical Therapy          General   General  Reason for Referral: Left quadriceps strain  Referred By: Dr. Levy    Subjective    [place] trip went well.  No flare ups in leg.  States she has very minimal pain along distal HS that is intermittent.  Has been able to progress return to run TM intervals at home.      Precautions  Precautions  Medical Precautions: No known precautions/limitation (Medical hx reviewed. Not likely to impact care.)    Pain  Pain Assessment  Pain Assessment: 0-10  Pain Score: 0 - No pain  Pain Location: Leg  Pain Orientation: Left    Objective   Not assessed     Treatments:  THERAPEUTIC EXERCISE:  Staggered bridges 10x  SL ER 10x  Bird dog 5\" x10 ea  SL DL with 5# 10x  Thorough review of RTR protocol moving fwd  Educated on appropriate HIIT workouts ([place], etc)    MANUAL THERAPY:      NEUROMUSCULAR RE-EDUCATION:      THERAPEUTIC ACTIVITY:      SELF-CARE:      OP EDUCATION:   Access Code: [email or web address]  URL: [email or web address]  Date: 03/13/2024  Prepared by: Leatha Noonan    Exercises  - Supine Bridge  - 2 x daily - 7 x weekly - 1 sets - 10 reps  - Clamshell  - 2 x daily - 7 x weekly - 1 sets - 10 reps  - Sidelying Hip Abduction  - 2 x daily - 7 x weekly - 1 sets - 10 " reps  - Gastroc Stretch on Step  - 2 x daily - 7 x weekly - 1 sets - 10 reps  - Side Lunge Adductor Stretch  - 2 x daily - 7 x weekly - 1 sets - 10 reps  - Single Leg Balance with Clock Reach  - 2 x daily - 7 x weekly - 1 sets - 10 reps  - Seated Hamstring Stretch  - 2 x daily - 7 x weekly - 1 sets - 10 reps  - Quadruped Hip Extension Kicks  - 2 x daily - 7 x weekly - 1 sets - 10 reps  - Hooklying Active Hamstring Stretch  - 2 x daily - 7 x weekly - 1 sets - 10 reps  - Side Stepping with Resistance at Feet  - 2 x daily - 7 x weekly - 1 sets - 10 reps  - Bird Dog  - 2 x daily - 7 x weekly - 1 sets - 10 reps - 5 hold  - Single Leg Deadlift with Kettlebell  - 2 x daily - 7 x weekly - 1 sets - 10 reps    Goals:  1. Pain 0/10  2. Outcomes Measure:  LEFS 80/80  3. Increase hip extension/abduction to 5/5 to allow patient to resume impact loading w/o increased symptoms  4. Demonstrates independence with HEP.

## 2024-05-21 ENCOUNTER — OFFICE VISIT (OUTPATIENT)
Dept: PRIMARY CARE | Facility: CLINIC | Age: 39
End: 2024-05-21
Payer: COMMERCIAL

## 2024-05-21 VITALS
HEIGHT: 67 IN | SYSTOLIC BLOOD PRESSURE: 124 MMHG | HEART RATE: 85 BPM | OXYGEN SATURATION: 98 % | DIASTOLIC BLOOD PRESSURE: 83 MMHG | TEMPERATURE: 98.8 F | WEIGHT: 152 LBS | BODY MASS INDEX: 23.86 KG/M2 | RESPIRATION RATE: 16 BRPM

## 2024-05-21 DIAGNOSIS — E78.49 OTHER HYPERLIPIDEMIA: ICD-10-CM

## 2024-05-21 DIAGNOSIS — K59.09 CHRONIC CONSTIPATION: ICD-10-CM

## 2024-05-21 DIAGNOSIS — J45.50 SEVERE PERSISTENT ASTHMA WITHOUT COMPLICATION (MULTI): Primary | ICD-10-CM

## 2024-05-21 ASSESSMENT — ENCOUNTER SYMPTOMS
EYES NEGATIVE: 1
PALPITATIONS: 0
ALLERGIC/IMMUNOLOGIC NEGATIVE: 1
GASTROINTESTINAL NEGATIVE: 1
WHEEZING: 0
NEUROLOGICAL NEGATIVE: 1
COUGH: 0
CONSTITUTIONAL NEGATIVE: 1
STRIDOR: 0
CARDIOVASCULAR NEGATIVE: 1
CHOKING: 0
ENDOCRINE NEGATIVE: 1
CHEST TIGHTNESS: 0
APNEA: 0
PSYCHIATRIC NEGATIVE: 1
MUSCULOSKELETAL NEGATIVE: 1
SHORTNESS OF BREATH: 0
RESPIRATORY NEGATIVE: 1
HEMATOLOGIC/LYMPHATIC NEGATIVE: 1

## 2024-05-21 ASSESSMENT — PATIENT HEALTH QUESTIONNAIRE - PHQ9
2. FEELING DOWN, DEPRESSED OR HOPELESS: NOT AT ALL
1. LITTLE INTEREST OR PLEASURE IN DOING THINGS: NOT AT ALL
SUM OF ALL RESPONSES TO PHQ9 QUESTIONS 1 AND 2: 0

## 2024-05-21 NOTE — PROGRESS NOTES
Subjective   Eden Quispe is a 39 y.o. female who presents for Follow-up (To review labs done 04.13.24) and Asthma.  Patient is here to Follow up Chronic Constipation, Asthma  Follow up To review labs done 04.13.24  Went last week and did some dry needling and it provided a lot of relief for her. Walking on it for 12 hours before they went to [place] and is doing much better now.   Her kids are 8 and 10 were able to ride everything and they did have a lot of fun there. Doing a little bit of [place]ata per PT recommendation. Feels like her metabolism is slowing as she is at the highest weight she recalls being for a while.   Was fasting for 2 hours before bed time to try to help.   I discussed with patient her BMI is normal and she would not be a candidate for weight loss medications at this time, she has healthy habits and diet.      Review of Systems   Constitutional: Negative.    HENT: Negative.     Eyes: Negative.    Respiratory: Negative.  Negative for apnea, cough, choking, chest tightness, shortness of breath, wheezing and stridor.    Cardiovascular: Negative.  Negative for chest pain, palpitations and leg swelling.   Gastrointestinal: Negative.    Endocrine: Negative.    Genitourinary: Negative.    Musculoskeletal: Negative.    Skin: Negative.    Allergic/Immunologic: Negative.    Neurological: Negative.    Hematological: Negative.    Psychiatric/Behavioral: Negative.     All other systems reviewed and are negative.      Objective   Physical Exam  Vitals reviewed.   HENT:      Head: Normocephalic.   Cardiovascular:      Rate and Rhythm: Normal rate and regular rhythm.      Pulses: Normal pulses.      Heart sounds: Normal heart sounds. No murmur heard.     No friction rub. No gallop.   Pulmonary:      Effort: Pulmonary effort is normal. No respiratory distress.      Breath sounds: Normal breath sounds. No stridor. No wheezing, rhonchi or rales.   Chest:      Chest wall: No tenderness.   Neurological:       "General: No focal deficit present.      Mental Status: She is alert and oriented to person, place, and time. Mental status is at baseline.       /83 (BP Location: Right arm, Patient Position: Sitting)   Pulse 85   Temp 37.1 °C (98.8 °F)   Resp 16   Ht 1.702 m (5' 7\")   Wt 68.9 kg (152 lb)   SpO2 98%   BMI 23.81 kg/m²       Assessment/Plan   Problem List Items Addressed This Visit       Asthma ([facility]) - Primary     Continues to be well controlled on Tezspire injections.   No nocturnal or daytime symptoms.          Chronic constipation    Other hyperlipidemia       "

## 2024-05-23 NOTE — PATIENT INSTRUCTIONS
Tips for a healthy lifestyle  -sleep 7-8 hours a night  -walk 8,000-10,000 steps per day   -limit screen time and mindless scrolling  -cut out fast food, processed food, seed oils and added sugars  -strength train 3x/week  -focus on stretching and mobility  -cut out toxic relationships/people/things that bring you down  -communicate your feelings  -eat 1900 calories per day (aim for 1 gram of protein per lb of body weight)  -no caffeine before food  -don't skip breakfast  -rest when your body is telling you to  -do a minimum of 10 minutes of silent breathing (meditation) in the morning  -sleep with your phone in a different room  -give more hugs, be more squishy  -tell people you care about that you love them  -express yourself in ways that make you feel good and alive

## 2024-05-29 ENCOUNTER — APPOINTMENT (OUTPATIENT)
Dept: ALLERGY | Facility: CLINIC | Age: 39
End: 2024-05-29
Payer: COMMERCIAL

## 2024-06-19 ENCOUNTER — APPOINTMENT (OUTPATIENT)
Dept: PHYSICAL THERAPY | Facility: CLINIC | Age: 39
End: 2024-06-19
Payer: COMMERCIAL

## 2024-07-08 DIAGNOSIS — F41.9 ANXIETY: ICD-10-CM

## 2024-07-09 ENCOUNTER — CLINICAL SUPPORT (OUTPATIENT)
Dept: ALLERGY | Facility: CLINIC | Age: 39
End: 2024-07-09
Payer: COMMERCIAL

## 2024-07-09 DIAGNOSIS — J45.51 SEVERE PERSISTENT ASTHMA WITH ACUTE EXACERBATION (MULTI): ICD-10-CM

## 2024-07-09 PROCEDURE — 96372 THER/PROPH/DIAG INJ SC/IM: CPT | Performed by: ALLERGY & IMMUNOLOGY

## 2024-07-09 RX ORDER — BUSPIRONE HYDROCHLORIDE 7.5 MG/1
TABLET ORAL
Qty: 270 TABLET | Refills: 1 | Status: SHIPPED | OUTPATIENT
Start: 2024-07-09

## 2024-08-06 DIAGNOSIS — J45.50 SEVERE PERSISTENT ASTHMA, UNSPECIFIED WHETHER COMPLICATED (MULTI): Primary | ICD-10-CM

## 2024-08-06 RX ORDER — TEZEPELUMAB-EKKO 210 MG/1.9ML
210 INJECTION, SOLUTION SUBCUTANEOUS
Qty: 1.91 ML | Refills: 11 | Status: SHIPPED | OUTPATIENT
Start: 2024-08-06 | End: 2024-08-07 | Stop reason: SDUPTHER

## 2024-08-07 ENCOUNTER — APPOINTMENT (OUTPATIENT)
Dept: ALLERGY | Facility: CLINIC | Age: 39
End: 2024-08-07
Payer: COMMERCIAL

## 2024-08-07 VITALS
WEIGHT: 156 LBS | DIASTOLIC BLOOD PRESSURE: 80 MMHG | OXYGEN SATURATION: 99 % | RESPIRATION RATE: 17 BRPM | SYSTOLIC BLOOD PRESSURE: 114 MMHG | BODY MASS INDEX: 24.48 KG/M2 | HEIGHT: 67 IN | HEART RATE: 72 BPM | TEMPERATURE: 98.7 F

## 2024-08-07 DIAGNOSIS — J30.2 SEASONAL ALLERGIC RHINITIS, UNSPECIFIED TRIGGER: ICD-10-CM

## 2024-08-07 DIAGNOSIS — B99.9 RECURRENT INFECTIONS: Primary | ICD-10-CM

## 2024-08-07 DIAGNOSIS — J45.50 SEVERE PERSISTENT ASTHMA, UNSPECIFIED WHETHER COMPLICATED (MULTI): ICD-10-CM

## 2024-08-07 PROCEDURE — 96372 THER/PROPH/DIAG INJ SC/IM: CPT | Performed by: ALLERGY & IMMUNOLOGY

## 2024-08-07 PROCEDURE — 99214 OFFICE O/P EST MOD 30 MIN: CPT | Performed by: ALLERGY & IMMUNOLOGY

## 2024-08-07 PROCEDURE — 3008F BODY MASS INDEX DOCD: CPT | Performed by: ALLERGY & IMMUNOLOGY

## 2024-08-07 RX ORDER — ALBUTEROL SULFATE AND BUDESONIDE 90; 80 UG/1; UG/1
2 AEROSOL, METERED RESPIRATORY (INHALATION) EVERY 4 HOURS PRN
Qty: 32.1 G | Refills: 1 | Status: SHIPPED | OUTPATIENT
Start: 2024-08-07 | End: 2024-08-07

## 2024-08-07 RX ORDER — TEZEPELUMAB-EKKO 210 MG/1.9ML
210 INJECTION, SOLUTION SUBCUTANEOUS
Qty: 1.91 ML | Refills: 11 | Status: SHIPPED | OUTPATIENT
Start: 2024-08-07 | End: 2025-08-04

## 2024-08-07 RX ORDER — ALBUTEROL SULFATE AND BUDESONIDE 90; 80 UG/1; UG/1
2 AEROSOL, METERED RESPIRATORY (INHALATION) EVERY 4 HOURS PRN
Qty: 32.1 G | Refills: 1 | Status: SHIPPED | OUTPATIENT
Start: 2024-08-07 | End: 2024-09-06

## 2024-08-07 RX ORDER — PREDNISONE 20 MG/1
20 TABLET ORAL 2 TIMES DAILY
Qty: 10 TABLET | Refills: 0 | Status: SHIPPED | OUTPATIENT
Start: 2024-08-07 | End: 2024-08-12

## 2024-08-07 NOTE — PROGRESS NOTES
Patient ID: Eden Quispe is a 39 y.o. female.     Chief Complaint: follow up  History Of Present Illness  Eden Quispe is a 39 y.o. female with PMx severe persistent asthma, allergic rhinitis presenting for follow up.     She was previously stable and was on Dupixent, but in the last 6 months increased exacerbation.  Has been on steroid and antibiotic with improvement, but with some continue cough and mucous production, no fever.  Taking flonase daily without AR/AC symptoms.    Review of Systems    Pertinent positives and negatives have been assessed in the HPI. All other systems have been reviewed and are negative except as noted in the HPI.    Allergies  Patient has no known allergies.    Past Medical History  She has a past medical history of Allergic, Anxiety, and Asthma ([facility]    Family History  Family History   Problem Relation Name Age of Onset    No Known Problems Mother      No Known Problems Father         No history of food allergy or atopic disease in the family.    Surgical History  She has a past surgical history that includes Other surgical history (08/08/2019); Other surgical history (08/08/2019); and Pelvic laparoscopy.    Social/Environmental History  She reports that she has never smoked. She has never been exposed to tobacco smoke. She has never used smokeless tobacco. She reports current alcohol use of about 2.0 standard drinks of alcohol per week. She reports that she does not use drugs.      MEDICATIONS  Current Outpatient Medications on File Prior to Visit   Medication Sig Dispense Refill    albuterol 90 mcg/actuation inhaler Inhale 2 puffs every 4 hours if needed for wheezing.      Breztri Aerosphere 160-9-4.8 mcg/actuation HFA aerosol inhaler Inhale 2 puffs every 12 hours.      busPIRone (Buspar) 7.5 mg tablet TAKE ONE TABLET BY MOUTH THREE TIMES A DAY (MORNING, EVENING, AND BEFORE BEDTIME) 270 tablet 1    carbinoxamine maleate 4 mg tablet TAKE ONE TABLET BY MOUTH EVERY 12HRS       "cholecalciferol (Vitamin D3) 25 MCG (1000 UT) capsule Take 1 capsule (25 mcg) by mouth once daily.      cyanocobalamin (Vitamin B-12) 100 mcg tablet Take 1 tablet (100 mcg) by mouth every other day.      fluticasone (Flonase) 50 mcg/actuation nasal spray Administer 2 sprays into each nostril once daily. Shake gently. Before first use, prime pump. After use, clean tip and replace cap. 16 g 5    ipratropium-albuteroL (Duo-Neb) 0.5-2.5 mg/3 mL nebulizer solution Take 3 mL by nebulization every 6 hours. 180 mL 11    Linzess 145 mcg capsule Take 1 capsule (145 mcg) by mouth once daily.      meloxicam (Mobic) 15 mg tablet Take 1 tablet (15 mg) by mouth once daily. 30 tablet 1    tezepelumab-ekko (Tezspire) SubQ Pen Injector Inject 1.9 mL (210 mg) under the skin every 30 (thirty) days. 1.91 mL 11    [DISCONTINUED] tezepelumab-ekko (Tezspire) SubQ Pen Injector Inject 210 mg under the skin.       No current facility-administered medications on file prior to visit.         Physical Exam  Visit Vitals  /80   Pulse 72   Temp 37.1 °C (98.7 °F) (Temporal)   Resp 17   Ht 1.702 m (5' 7\")   Wt 70.8 kg (156 lb)   SpO2 99%   BMI 24.43 kg/m²   OB Status Having periods   Smoking Status Never   BSA 1.83 m²       Wt Readings from Last 1 Encounters:   08/07/24 70.8 kg (156 lb)       Physical Exam    General: Well appearing, no acute distress  Head: Normocephalic, atraumatic, neck supple without lymphadenopathy  Eyes: EOMI, non-injected  Nose: No nasal crease, nares patent, slightly boggy turbinates, minimal discharge  Throat: Normal dentition, no erythema  Heart: Regular rate and rhythm  Lungs: wet cough, wheezing all lung fields, no rales.  Abdomen: Soft, non-tender, normal bowel sounds  Extremities: Moves all extremities symmetrically, no edema  Skin: No rashes/lesions  Psych: normal mood and affect    LAB RESULTS:  CBC:  Recent Labs     04/13/24  0928 12/20/22  1543 09/23/22  1347 03/15/22  1402   WBC 8.6 5.8 6.1 6.2   HGB 12.8 " 12.9 12.7 13.0   HCT 39.8 39.7 39.4 40.9    157 181 164   MCV 88 84 87 88   EOSABS  --  0.09 0.08 0.22       CMP:  Recent Labs     04/13/24  0928 12/20/22  1543 09/23/22  1347    137 138   K 3.9 3.9 3.8    102 102   CO2 25 28 26   ANIONGAP 13 11 14   BUN 16 15 14   CREATININE 0.80 0.94 0.91   EGFR >90  --   --    MG 1.98  --   --      Recent Labs     04/13/24  0928 12/20/22  1543 09/23/22  1347   ALBUMIN 4.6 4.4 4.5   ALKPHOS 66 60 48   ALT 22 13 12   AST 21 16 17   BILITOT 0.4 0.3 0.5       Recent Labs     12/20/22  1543 09/23/22  1347 03/15/22  1402   EOSABS 0.09 0.08 0.22         IMMUNO:   Recent Labs     03/15/22  1402   IGG 1,070      IGM 86     HEME/ENDO:  Recent Labs     04/13/24  0928 06/24/20  1459 09/23/19  0946   TSH 1.50 1.57 0.83   HGBA1C 5.1  --   --      Tezspire    Assessment/Plan     Eden is a 38 yo with allergic rhinitis and severe persistent asthma. She is improved from recent exacerbation s/p steroids and antibiotics, but still with wheeze and cough.  We reviewed daily medications and step up plan. Tezspire today.  Repeat immune and allergy labs and I will call results.  Leonela Barbosa [place]

## 2024-08-12 DIAGNOSIS — K59.09 CHRONIC CONSTIPATION: ICD-10-CM

## 2024-08-12 RX ORDER — LINACLOTIDE 145 UG/1
145 CAPSULE, GELATIN COATED ORAL DAILY
Qty: 30 CAPSULE | Refills: 2 | Status: SHIPPED | OUTPATIENT
Start: 2024-08-12 | End: 2024-08-14 | Stop reason: SDUPTHER

## 2024-08-13 ENCOUNTER — LAB (OUTPATIENT)
Dept: LAB | Facility: LAB | Age: 39
End: 2024-08-13
Payer: COMMERCIAL

## 2024-08-13 DIAGNOSIS — B99.9 RECURRENT INFECTIONS: ICD-10-CM

## 2024-08-13 DIAGNOSIS — J30.2 SEASONAL ALLERGIC RHINITIS, UNSPECIFIED TRIGGER: ICD-10-CM

## 2024-08-13 PROCEDURE — 36415 COLL VENOUS BLD VENIPUNCTURE: CPT

## 2024-08-13 PROCEDURE — 86003 ALLG SPEC IGE CRUDE XTRC EA: CPT

## 2024-08-13 PROCEDURE — 82784 ASSAY IGA/IGD/IGG/IGM EACH: CPT

## 2024-08-13 PROCEDURE — 82785 ASSAY OF IGE: CPT

## 2024-08-13 PROCEDURE — 86317 IMMUNOASSAY INFECTIOUS AGENT: CPT

## 2024-08-14 DIAGNOSIS — K59.09 CHRONIC CONSTIPATION: ICD-10-CM

## 2024-08-14 LAB
A ALTERNATA IGE QN: 0.72 KU/L
A FUMIGATUS IGE QN: <0.1 KU/L
BERMUDA GRASS IGE QN: <0.1 KU/L
BOXELDER IGE QN: <0.1 KU/L
C HERBARUM IGE QN: <0.1 KU/L
CALIF WALNUT POLN IGE QN: <0.1 KU/L
CAT DANDER IGE QN: <0.1 KU/L
CMN PIGWEED IGE QN: <0.1 KU/L
COMMON RAGWEED IGE QN: <0.1 KU/L
COTTONWOOD IGE QN: <0.1 KU/L
D FARINAE IGE QN: 0.27 KU/L
D PTERONYSS IGE QN: 0.24 KU/L
DOG DANDER IGE QN: 0.25 KU/L
ENGL PLANTAIN IGE QN: <0.1 KU/L
GOOSEFOOT IGE QN: <0.1 KU/L
IGA SERPL-MCNC: 213 MG/DL (ref 70–400)
IGG SERPL-MCNC: 1040 MG/DL (ref 700–1600)
IGM SERPL-MCNC: 90 MG/DL (ref 40–230)
JOHNSON GRASS IGE QN: <0.1 KU/L
KENT BLUE GRASS IGE QN: <0.1 KU/L
LONDON PLANE IGE QN: <0.1 KU/L
MT JUNIPER IGE QN: <0.1 KU/L
P NOTATUM IGE QN: <0.1 KU/L
PECAN/HICK TREE IGE QN: <0.1 KU/L
ROACH IGE QN: <0.1 KU/L
SALTWORT IGE QN: <0.1 KU/L
SHEEP SORREL IGE QN: <0.1 KU/L
SILVER BIRCH IGE QN: <0.1 KU/L
TIMOTHY IGE QN: <0.1 KU/L
TOTAL IGE SMQN RAST: 26.8 KU/L
WHITE ASH IGE QN: <0.1 KU/L
WHITE ELM IGE QN: <0.1 KU/L
WHITE MULBERRY IGE QN: <0.1 KU/L
WHITE OAK IGE QN: <0.1 KU/L

## 2024-08-14 RX ORDER — LINACLOTIDE 145 UG/1
145 CAPSULE, GELATIN COATED ORAL DAILY
Qty: 30 CAPSULE | Refills: 2 | Status: SHIPPED | OUTPATIENT
Start: 2024-08-14 | End: 2024-11-12

## 2024-08-17 LAB
S PN DA SERO 19F IGG SER-MCNC: 2.29 UG/ML
S PNEUM DA 1 IGG SER-MCNC: 8 UG/ML
S PNEUM DA 10A IGG SER-MCNC: 1.6 UG/ML
S PNEUM DA 11A IGG SER-MCNC: 2.58 UG/ML
S PNEUM DA 12F IGG SER-MCNC: 9.08 UG/ML
S PNEUM DA 14 IGG SER-MCNC: 7.91 UG/ML
S PNEUM DA 15B IGG SER-MCNC: 0.7 UG/ML
S PNEUM DA 17F IGG SER-MCNC: 6.05 UG/ML
S PNEUM DA 18C IGG SER-MCNC: 4.56 UG/ML
S PNEUM DA 19A IGG SER-MCNC: 1.03 UG/ML
S PNEUM DA 2 IGG SER-MCNC: 3.71 UG/ML
S PNEUM DA 20A IGG SER-MCNC: 8.25 UG/ML
S PNEUM DA 22F IGG SER-MCNC: 3.05 UG/ML
S PNEUM DA 23F IGG SER-MCNC: 1.1 UG/ML
S PNEUM DA 3 IGG SER-MCNC: 0.94 UG/ML
S PNEUM DA 33F IGG SER-MCNC: 2.2 UG/ML
S PNEUM DA 4 IGG SER-MCNC: 5.64 UG/ML
S PNEUM DA 5 IGG SER-MCNC: 0.4 UG/ML
S PNEUM DA 6B IGG SER-MCNC: 1.76 UG/ML
S PNEUM DA 7F IGG SER-MCNC: 10.52 UG/ML
S PNEUM DA 8 IGG SER-MCNC: 4.83 UG/ML
S PNEUM DA 9N IGG SER-MCNC: 4.91 UG/ML
S PNEUM DA 9V IGG SER-MCNC: 6.3 UG/ML
S PNEUM SEROTYPE IGG SER-IMP: NORMAL

## 2024-08-19 DIAGNOSIS — K59.09 CHRONIC CONSTIPATION: ICD-10-CM

## 2024-08-19 RX ORDER — LINACLOTIDE 145 UG/1
145 CAPSULE, GELATIN COATED ORAL DAILY
Qty: 90 CAPSULE | Refills: 0 | Status: SHIPPED | OUTPATIENT
Start: 2024-08-19 | End: 2024-11-17

## 2024-08-29 DIAGNOSIS — J45.51 SEVERE PERSISTENT ASTHMA WITH ACUTE EXACERBATION (MULTI): Primary | ICD-10-CM

## 2024-08-29 RX ORDER — PREDNISONE 20 MG/1
20 TABLET ORAL 2 TIMES DAILY
Qty: 14 TABLET | Refills: 0 | Status: SHIPPED | OUTPATIENT
Start: 2024-08-29 | End: 2024-09-05

## 2024-09-04 ENCOUNTER — LAB (OUTPATIENT)
Dept: LAB | Facility: LAB | Age: 39
End: 2024-09-04
Payer: COMMERCIAL

## 2024-09-04 ENCOUNTER — HOSPITAL ENCOUNTER (OUTPATIENT)
Dept: RADIOLOGY | Facility: CLINIC | Age: 39
Discharge: HOME | End: 2024-09-04
Payer: COMMERCIAL

## 2024-09-04 ENCOUNTER — APPOINTMENT (OUTPATIENT)
Dept: ALLERGY | Facility: CLINIC | Age: 39
End: 2024-09-04
Payer: COMMERCIAL

## 2024-09-04 VITALS
DIASTOLIC BLOOD PRESSURE: 70 MMHG | BODY MASS INDEX: 24.48 KG/M2 | HEART RATE: 63 BPM | WEIGHT: 156 LBS | SYSTOLIC BLOOD PRESSURE: 124 MMHG | RESPIRATION RATE: 18 BRPM | TEMPERATURE: 98 F | OXYGEN SATURATION: 99 % | HEIGHT: 67 IN

## 2024-09-04 DIAGNOSIS — J30.2 SEASONAL ALLERGIC RHINITIS, UNSPECIFIED TRIGGER: ICD-10-CM

## 2024-09-04 DIAGNOSIS — J45.51 SEVERE PERSISTENT ASTHMA WITH ACUTE EXACERBATION (MULTI): Primary | ICD-10-CM

## 2024-09-04 DIAGNOSIS — J45.51 SEVERE PERSISTENT ASTHMA WITH ACUTE EXACERBATION (MULTI): ICD-10-CM

## 2024-09-04 LAB — A1AT SERPL NEPH-MCNC: 129 MG/DL (ref 84–218)

## 2024-09-04 PROCEDURE — 36415 COLL VENOUS BLD VENIPUNCTURE: CPT

## 2024-09-04 PROCEDURE — 86606 ASPERGILLUS ANTIBODY: CPT

## 2024-09-04 PROCEDURE — 71046 X-RAY EXAM CHEST 2 VIEWS: CPT | Performed by: RADIOLOGY

## 2024-09-04 PROCEDURE — 96372 THER/PROPH/DIAG INJ SC/IM: CPT | Performed by: ALLERGY & IMMUNOLOGY

## 2024-09-04 PROCEDURE — 82103 ALPHA-1-ANTITRYPSIN TOTAL: CPT

## 2024-09-04 PROCEDURE — 99214 OFFICE O/P EST MOD 30 MIN: CPT | Performed by: ALLERGY & IMMUNOLOGY

## 2024-09-04 PROCEDURE — 86331 IMMUNODIFFUSION OUCHTERLONY: CPT

## 2024-09-04 PROCEDURE — 71046 X-RAY EXAM CHEST 2 VIEWS: CPT

## 2024-09-04 PROCEDURE — 94640 AIRWAY INHALATION TREATMENT: CPT | Performed by: ALLERGY & IMMUNOLOGY

## 2024-09-04 PROCEDURE — 3008F BODY MASS INDEX DOCD: CPT | Performed by: ALLERGY & IMMUNOLOGY

## 2024-09-04 RX ORDER — PREDNISONE 10 MG/1
10 TABLET ORAL DAILY
Qty: 14 TABLET | Refills: 0 | Status: SHIPPED | OUTPATIENT
Start: 2024-09-04 | End: 2024-09-18

## 2024-09-04 RX ORDER — IPRATROPIUM BROMIDE AND ALBUTEROL SULFATE 2.5; .5 MG/3ML; MG/3ML
3 SOLUTION RESPIRATORY (INHALATION) ONCE
Status: COMPLETED | OUTPATIENT
Start: 2024-09-04 | End: 2024-09-04

## 2024-09-04 RX ORDER — PREDNISONE 10 MG/1
10 TABLET ORAL DAILY
Qty: 14 TABLET | Refills: 0 | Status: SHIPPED | OUTPATIENT
Start: 2024-09-04 | End: 2024-09-04 | Stop reason: SDUPTHER

## 2024-09-04 NOTE — PROGRESS NOTES
Patient ID: Eden Quispe is a 39 y.o. female.     Chief Complaint: follow up  History Of Present Illness  Eden Quispe is a 39 y.o. female with PMx severe persistent asthma presenting for follow up and Tezspire.     Food Allergy  No    Eczema/ Atopic Dermatitis  No    Asthma  Flared an on BID 20 mg prednisone.  Still with cough. Using Breztri and AirSupra or her nebulizer.  Feels Tezspire has helped. When goes outside feels irritated in her lungs and having increased wheeze.  Cough is productive for clear mucous and there is no fever.    Rhinoconjunctivitis  Some increased symptoms outside.    Drug Allergy   No    Insect Allergy   No        Review of Systems    Pertinent positives and negatives have been assessed in the HPI. All other systems have been reviewed and are negative except as noted in the HPI.    Allergies  Patient has no known allergies.    Past Medical History  She has a past medical history of Allergic, Anxiety, and Asthma ([facility]).    Family History  Family History   Problem Relation Name Age of Onset    No Known Problems Mother      No Known Problems Father       Patient is adopted. She recently found out that her birth mother had COPD.    Surgical History  She has a past surgical history that includes Other surgical history (08/08/2019); Other surgical history (08/08/2019); and Pelvic laparoscopy.    Social/Environmental History  She reports that she has never smoked. She has never been exposed to tobacco smoke. She has never used smokeless tobacco. She reports current alcohol use of about 2.0 standard drinks of alcohol per week. She reports that she does not use drugs.        MEDICATIONS  Current Outpatient Medications on File Prior to Visit   Medication Sig Dispense Refill    albuterol 90 mcg/actuation inhaler Inhale 2 puffs every 4 hours if needed for wheezing.      albuterol-budesonide (Airsupra) 90-80 mcg/actuation inhaler Inhale 2 puffs every 4 hours if needed (cough, wheeze, short of  "breath). Patient has a coupon 32.1 g 1    Breztri Aerosphere 160-9-4.8 mcg/actuation HFA aerosol inhaler Inhale 2 puffs every 12 hours.      busPIRone (Buspar) 7.5 mg tablet TAKE ONE TABLET BY MOUTH THREE TIMES A DAY (MORNING, EVENING, AND BEFORE BEDTIME) 270 tablet 1    carbinoxamine maleate 4 mg tablet TAKE ONE TABLET BY MOUTH EVERY 12HRS      cholecalciferol (Vitamin D3) 25 MCG (1000 UT) capsule Take 1 capsule (25 mcg) by mouth once daily.      cyanocobalamin (Vitamin B-12) 100 mcg tablet Take 1 tablet (100 mcg) by mouth every other day.      fluticasone (Flonase) 50 mcg/actuation nasal spray Administer 2 sprays into each nostril once daily. Shake gently. Before first use, prime pump. After use, clean tip and replace cap. 16 g 5    ipratropium-albuteroL (Duo-Neb) 0.5-2.5 mg/3 mL nebulizer solution Take 3 mL by nebulization every 6 hours. 180 mL 11    Linzess 145 mcg capsule Take 1 capsule (145 mcg) by mouth once daily. 90 capsule 0    meloxicam (Mobic) 15 mg tablet Take 1 tablet (15 mg) by mouth once daily. 30 tablet 1    predniSONE (Deltasone) 20 mg tablet Take 1 tablet (20 mg) by mouth 2 times a day for 7 days. 14 tablet 0    tezepelumab-ekko (Tezspire) SubQ Pen Injector Inject 1.9 mL (210 mg) under the skin every 30 (thirty) days. 1.91 mL 11    linaCLOtide (Linzess) 145 mcg capsule Take 1 capsule (145 mcg) by mouth once daily in the morning. Take before meals for 7 days. Do not crush or chew. 7 capsule 0     No current facility-administered medications on file prior to visit.     Physical Exam  Visit Vitals  /70   Pulse 63   Temp 36.7 °C (98 °F) (Temporal)   Resp 18   Ht 1.702 m (5' 7\")   Wt 70.8 kg (156 lb)   SpO2 99%   BMI 24.43 kg/m²   OB Status Having periods   Smoking Status Never   BSA 1.83 m²       Wt Readings from Last 1 Encounters:   09/04/24 70.8 kg (156 lb)       Physical Exam    General: Well appearing, no acute distress  Head: Normocephalic, atraumatic, neck supple without " lymphadenopathy  Eyes: EOMI, non-injected  Nose: No nasal crease, nares patent, slightly boggy turbinates, minimal discharge  Throat: Normal dentition, no erythema  Heart: Regular rate and rhythm  Lungs: Decrease lung sounds with rales at bases which did clear after nebulizer treatment.  Scattered quiet eew improved post nebulizer.  Abdomen: Soft, non-tender, normal bowel sounds  Extremities: Moves all extremities symmetrically, no edema  Skin: No rashes/lesions  Psych: normal mood and affect    LAB RESULTS:  CBC:  Recent Labs     04/13/24  0928 12/20/22  1543 09/23/22  1347 03/15/22  1402   WBC 8.6 5.8 6.1 6.2   HGB 12.8 12.9 12.7 13.0   HCT 39.8 39.7 39.4 40.9    157 181 164   MCV 88 84 87 88   EOSABS  --  0.09 0.08 0.22       CMP:  Recent Labs     04/13/24  0928 12/20/22  1543 09/23/22  1347    137 138   K 3.9 3.9 3.8    102 102   CO2 25 28 26   ANIONGAP 13 11 14   BUN 16 15 14   CREATININE 0.80 0.94 0.91   EGFR >90  --   --    MG 1.98  --   --      Recent Labs     04/13/24  0928 12/20/22  1543 09/23/22  1347   ALBUMIN 4.6 4.4 4.5   ALKPHOS 66 60 48   ALT 22 13 12   AST 21 16 17   BILITOT 0.4 0.3 0.5       ALLERGY:   Lab Results   Component Value Date    ICIGE 26.8 08/13/2024    WHITEASH <0.10 08/13/2024    SILVERBIRCH <0.10 08/13/2024    BOXELDER <0.10 08/13/2024    MOUNTJUNIPER <0.10 08/13/2024    COTTONWOOD <0.10 08/13/2024    ELM <0.10 08/13/2024    MULBERRY <0.10 08/13/2024    PECANHICKORY <0.10 08/13/2024    MAPLESYCAMOR <0.10 08/13/2024    OAK <0.10 08/13/2024    BERMUDAGR <0.10 08/13/2024    JOHNSONGR <0.10 08/13/2024    BLUEGRASS <0.10 08/13/2024    TIMOTHYGRASS <0.10 08/13/2024     Lab Results   Component Value Date    LAMBQUART <0.10 08/13/2024    PIGWEED <0.10 08/13/2024    COMRAGWEED <0.10 08/13/2024    RUSSIANT <0.10 08/13/2024    SHEEPSOR <0.10 08/13/2024    PLANTAIN <0.10 08/13/2024    CATEPI <0.10 08/13/2024    DOGEPI 0.25 (Equiv IgE) 08/13/2024    ALTERNA 0.72 (Mod) 08/13/2024     CLADHERB <0.10 08/13/2024    ICA04 <0.10 08/13/2024    PENICILLIUM <0.10 08/13/2024    DERMFAR 0.27 (Equiv IgE) 08/13/2024    DERMPTE 0.24 (Equiv IgE) 08/13/2024    COCKR <0.10 08/13/2024       Recent Labs     08/13/24  1315   ICIGE 26.8     Recent Labs     12/20/22  1543 09/23/22  1347 03/15/22  1402   EOSABS 0.09 0.08 0.22         IMMUNO:   Recent Labs     08/13/24  1315 03/15/22  1402   IGG 1,040 1,070    205   IGM 90 86       HEME/ENDO:  Recent Labs     04/13/24  0928 06/24/20  1459 09/23/19  0946   TSH 1.50 1.57 0.83   HGBA1C 5.1  --   --      Nebulizer given    Assessment/Plan   Allergic rhinitis and severe persistsent asthma. Still with continued symptoms weaning steroids. Obtain cxr. Slower wean on steroids. Obtain additional labs and continue Breztri and Tezspire.  Follow up in 2 wks  Leonela Simon DO

## 2024-09-09 ENCOUNTER — TELEPHONE (OUTPATIENT)
Dept: ALLERGY | Facility: CLINIC | Age: 39
End: 2024-09-09
Payer: COMMERCIAL

## 2024-09-09 DIAGNOSIS — J45.50 SEVERE PERSISTENT ASTHMA WITHOUT COMPLICATION (MULTI): Primary | ICD-10-CM

## 2024-09-10 LAB
A FUMIGATUS1 AB SER QL ID: NORMAL
A FUMIGATUS6 AB SER QL ID: NORMAL
A PULLULANS AB SER QL ID: NORMAL
PIGEON SERUM AB QL ID: NORMAL
S RECTIVIRGULA AB SER QL ID: NORMAL

## 2024-09-16 ENCOUNTER — APPOINTMENT (OUTPATIENT)
Dept: PRIMARY CARE | Facility: CLINIC | Age: 39
End: 2024-09-16
Payer: COMMERCIAL

## 2024-09-18 ENCOUNTER — APPOINTMENT (OUTPATIENT)
Dept: ALLERGY | Facility: CLINIC | Age: 39
End: 2024-09-18
Payer: COMMERCIAL

## 2024-09-25 ENCOUNTER — HOSPITAL ENCOUNTER (OUTPATIENT)
Dept: RADIOLOGY | Facility: CLINIC | Age: 39
Discharge: HOME | End: 2024-09-25
Payer: COMMERCIAL

## 2024-09-25 DIAGNOSIS — J45.50 SEVERE PERSISTENT ASTHMA WITHOUT COMPLICATION (MULTI): ICD-10-CM

## 2024-09-25 PROCEDURE — 71250 CT THORAX DX C-: CPT | Performed by: RADIOLOGY

## 2024-09-25 PROCEDURE — 71250 CT THORAX DX C-: CPT

## 2024-09-26 DIAGNOSIS — J18.9 PNEUMONIA, PRIMARY ATYPICAL: Primary | ICD-10-CM

## 2024-09-26 RX ORDER — AMOXICILLIN 500 MG/1
500 CAPSULE ORAL 3 TIMES DAILY
Qty: 30 CAPSULE | Refills: 0 | Status: SHIPPED | OUTPATIENT
Start: 2024-09-26 | End: 2024-10-06

## 2024-10-01 ENCOUNTER — CLINICAL SUPPORT (OUTPATIENT)
Dept: ALLERGY | Facility: CLINIC | Age: 39
End: 2024-10-01
Payer: COMMERCIAL

## 2024-10-01 DIAGNOSIS — J45.51 SEVERE PERSISTENT ASTHMA WITH ACUTE EXACERBATION (MULTI): ICD-10-CM

## 2024-10-01 PROCEDURE — 96372 THER/PROPH/DIAG INJ SC/IM: CPT | Performed by: ALLERGY & IMMUNOLOGY

## 2024-10-22 ENCOUNTER — APPOINTMENT (OUTPATIENT)
Dept: ALLERGY | Facility: CLINIC | Age: 39
End: 2024-10-22
Payer: COMMERCIAL

## 2024-10-22 VITALS
RESPIRATION RATE: 17 BRPM | WEIGHT: 150 LBS | HEIGHT: 67 IN | TEMPERATURE: 97.9 F | HEART RATE: 71 BPM | SYSTOLIC BLOOD PRESSURE: 110 MMHG | BODY MASS INDEX: 23.54 KG/M2 | DIASTOLIC BLOOD PRESSURE: 64 MMHG | OXYGEN SATURATION: 96 %

## 2024-10-22 DIAGNOSIS — J30.1 SEASONAL ALLERGIC RHINITIS DUE TO POLLEN: ICD-10-CM

## 2024-10-22 DIAGNOSIS — J18.9 PNEUMONIA, PRIMARY ATYPICAL: ICD-10-CM

## 2024-10-22 DIAGNOSIS — J45.50 SEVERE PERSISTENT ASTHMA, UNSPECIFIED WHETHER COMPLICATED (MULTI): Primary | ICD-10-CM

## 2024-10-22 PROCEDURE — 3008F BODY MASS INDEX DOCD: CPT | Performed by: ALLERGY & IMMUNOLOGY

## 2024-10-22 PROCEDURE — 99214 OFFICE O/P EST MOD 30 MIN: CPT | Performed by: ALLERGY & IMMUNOLOGY

## 2024-10-22 RX ORDER — ALBUTEROL SULFATE AND BUDESONIDE 90; 80 UG/1; UG/1
2 AEROSOL, METERED RESPIRATORY (INHALATION) EVERY 6 HOURS PRN
COMMUNITY

## 2024-10-22 RX ORDER — AMOXICILLIN 500 MG/1
500 CAPSULE ORAL 3 TIMES DAILY
Qty: 30 CAPSULE | Refills: 0 | Status: SHIPPED | OUTPATIENT
Start: 2024-10-22 | End: 2024-11-01

## 2024-10-22 NOTE — PROGRESS NOTES
Patient ID: Eden Quispe is a 39 y.o. female.     Chief Complaint: Follow up  History Of Present Illness  Eden Quispe is a 39 y.o. female with PMx severe persistent asthma presenting for follow up s/p antibiotics and review of CT chest.     Review of Systems    Pertinent positives and negatives have been assessed in the HPI. All other systems have been reviewed and are negative except as noted in the HPI.    Allergies  Patient has no known allergies.    Past Medical History  She has a past medical history of Allergic, Anxiety, and Asthma ([facility]).    Family History  Family History   Problem Relation Name Age of Onset    No Known Problems Mother      No Known Problems Father         Surgical History  She has a past surgical history that includes Other surgical history (08/08/2019); Other surgical history (08/08/2019); and Pelvic laparoscopy.    Social/Environmental History  She reports that she has never smoked. She has never been exposed to tobacco smoke. She has never used smokeless tobacco. She reports current alcohol use of about 2.0 standard drinks of alcohol per week. She reports that she does not use drugs.      MEDICATIONS  Current Outpatient Medications on File Prior to Visit   Medication Sig Dispense Refill    albuterol 90 mcg/actuation inhaler Inhale 2 puffs every 4 hours if needed for wheezing.      albuterol-budesonide (Airsupra) 90-80 mcg/actuation inhaler Inhale 2 puffs every 6 hours if needed.      Breztri Aerosphere 160-9-4.8 mcg/actuation HFA aerosol inhaler Inhale 2 puffs every 12 hours.      busPIRone (Buspar) 7.5 mg tablet TAKE ONE TABLET BY MOUTH THREE TIMES A DAY (MORNING, EVENING, AND BEFORE BEDTIME) 270 tablet 1    carbinoxamine maleate 4 mg tablet TAKE ONE TABLET BY MOUTH EVERY 12HRS      cholecalciferol (Vitamin D3) 25 MCG (1000 UT) capsule Take 1 capsule (25 mcg) by mouth once daily.      cyanocobalamin (Vitamin B-12) 100 mcg tablet Take 1 tablet (100 mcg) by mouth every other day.  "     fluticasone (Flonase) 50 mcg/actuation nasal spray Administer 2 sprays into each nostril once daily. Shake gently. Before first use, prime pump. After use, clean tip and replace cap. 16 g 5    ipratropium-albuteroL (Duo-Neb) 0.5-2.5 mg/3 mL nebulizer solution Take 3 mL by nebulization every 6 hours. 180 mL 11    Linzess 145 mcg capsule Take 1 capsule (145 mcg) by mouth once daily. 90 capsule 0    meloxicam (Mobic) 15 mg tablet Take 1 tablet (15 mg) by mouth once daily. 30 tablet 1    tezepelumab-ekko (Tezspire) SubQ Pen Injector Inject 1.9 mL (210 mg) under the skin every 30 (thirty) days. 1.91 mL 11    linaCLOtide (Linzess) 145 mcg capsule Take 1 capsule (145 mcg) by mouth once daily in the morning. Take before meals for 7 days. Do not crush or chew. 7 capsule 0     No current facility-administered medications on file prior to visit.         Physical Exam  Visit Vitals  /64   Pulse 71   Temp 36.6 °C (97.9 °F) (Temporal)   Resp 17   Ht 1.702 m (5' 7\")   Wt 68 kg (150 lb)   SpO2 96%   BMI 23.49 kg/m²   OB Status Having periods   Smoking Status Never   BSA 1.79 m²       Wt Readings from Last 1 Encounters:   10/22/24 68 kg (150 lb)       Physical Exam    General: Well appearing, no acute distress  Head: Normocephalic, atraumatic, neck supple without lymphadenopathy  Eyes: non-injected  Nose: No nasal crease, nares patent, slightly boggy turbinates, minimal discharge  Throat: Normal dentition, no erythema  Heart: Regular rate and rhythm  Lungs: Bronchial breath sounds.  EEW noted right.    Abdomen: Soft, non-tender, normal bowel sounds  Extremities: Moves all extremities symmetrically, no edema  Skin: No rashes/lesions  Psych: normal mood and affect    LAB RESULTS:  CBC:  Recent Labs     04/13/24  0928 12/20/22  1543 09/23/22  1347 03/15/22  1402   WBC 8.6 5.8 6.1 6.2   HGB 12.8 12.9 12.7 13.0   HCT 39.8 39.7 39.4 40.9    157 181 164   MCV 88 84 87 88   EOSABS  --  0.09 0.08 0.22       CMP:  Recent " Labs     04/13/24  0928 12/20/22  1543 09/23/22  1347    137 138   K 3.9 3.9 3.8    102 102   CO2 25 28 26   ANIONGAP 13 11 14   BUN 16 15 14   CREATININE 0.80 0.94 0.91   EGFR >90  --   --    MG 1.98  --   --      Recent Labs     04/13/24  0928 12/20/22  1543 09/23/22  1347   ALBUMIN 4.6 4.4 4.5   ALKPHOS 66 60 48   ALT 22 13 12   AST 21 16 17   BILITOT 0.4 0.3 0.5       ALLERGY:   Lab Results   Component Value Date    ICIGE 26.8 08/13/2024    WHITEASH <0.10 08/13/2024    SILVERBIRCH <0.10 08/13/2024    BOXELDER <0.10 08/13/2024    MOUNTJUNIPER <0.10 08/13/2024    COTTONWOOD <0.10 08/13/2024    ELM <0.10 08/13/2024    MULBERRY <0.10 08/13/2024    PECANHICKORY <0.10 08/13/2024    MAPLESYCAMOR <0.10 08/13/2024    OAK <0.10 08/13/2024    BERMUDAGR <0.10 08/13/2024    JOHNSONGR <0.10 08/13/2024    BLUEGRASS <0.10 08/13/2024    TIMOTHYGRASS <0.10 08/13/2024     Lab Results   Component Value Date    LAMBQUART <0.10 08/13/2024    PIGWEED <0.10 08/13/2024    COMRAGWEED <0.10 08/13/2024    RUSSIANT <0.10 08/13/2024    SHEEPSOR <0.10 08/13/2024    PLANTAIN <0.10 08/13/2024    CATEPI <0.10 08/13/2024    DOGEPI 0.25 (Equiv IgE) 08/13/2024    ALTERNA 0.72 (Mod) 08/13/2024    CLADHERB <0.10 08/13/2024    ICA04 <0.10 08/13/2024    PENICILLIUM <0.10 08/13/2024    DERMFAR 0.27 (Equiv IgE) 08/13/2024    DERMPTE 0.24 (Equiv IgE) 08/13/2024    COCKR <0.10 08/13/2024     Recent Labs     08/13/24  1315   ICIGE 26.8     Recent Labs     12/20/22  1543 09/23/22  1347 03/15/22  1402   EOSABS 0.09 0.08 0.22         IMMUNO:   Recent Labs     08/13/24  1315 03/15/22  1402   IGG 1,040 1,070    205   IGM 90 86       HEME/ENDO:  Recent Labs     04/13/24  0928 06/24/20  1459 09/23/19  0946   TSH 1.50 1.57 0.83   HGBA1C 5.1  --   --          Component  Ref Range & Units 1 mo ago   Alpha-1 Antitrypsin  84 - 218 mg/dL 129   Resulting Agency [facility]      0 Result Notes       1 Follow-up Encounter      Component  Ref Range & Units 1 mo  ago   Aspergillus fumigatus #1 Ab, Precipitin  None Detected None Detected   Aspergillus fumigatus #6 Ab, Precipitin  None Detected See Note   Comment:  A. fumigatus #6 Ab, Precipitin testing not performed due to  reagent backorder. A credit will be issued for this component.   Aureobasidium pullulans Ab, Precipitin  None Detected None Detected   [place] Serum Ab, Precipitin  None Detected None Detected   Micropolyspora Faeni Ab, Precipitin  None Detected None Detected   Comment:  Testing includes antibodies directed at Aureobasidium pullulans,  Aspergillus fumigatus #1, Aspergillus fumigatus #6, Micropolyspora  faeni, and Min[place] Serum.  Performed By: [facility]  [address] [place] [id number]  : Manuel Palacio MD, PhD  CLIA Number: [id number]   Resulting Agency [facility]             Specimen Collected: 09/04/24 15:09 Last Resulted: 09/10/24 04:43   0 Result Notes          Component  Ref Range & Units 2 mo ago  (8/13/24) 2 yr ago  (9/23/22) 2 yr ago  (3/15/22)   Serotype 1  ug/mL 8.00 >14.2 R 0.3 Low  R   Serotype 2  ug/mL 3.71 >20.7 R 0.3 Low  R   Serotype 3  ug/mL 0.94 6.2 R 1.6 R   Serotype 4  ug/mL 5.64 >8.3 R 0.1 Low  R   Serotype 5  ug/mL 0.40 3.9 R 0.4 Low  R   Serotype 8  ug/mL 4.83 >25.3 R 0.2 Low  R   Serotype 9N  ug/mL 4.91 >21.7 R 0.3 Low  R   Serotype 12F  ug/mL 9.08 >19.5 R 0.1 Low  R   Serotype 14  ug/mL 7.91 >18.7 R 1.0 Low  R   Serotype 17F  ug/mL 6.05 >20.2 R 0.3 Low  R   Serotype 19F  ug/mL 2.29 17.3 R 1.7 R   Serotype 20  ug/mL 8.25 >38.2 R 0.9 Low  R   Serotype 22F  ug/mL 3.05 15.9 R 1.8 R   Serotype 23F  ug/mL 1.10 0.4 Low  R <0.1 Low  R   Serotype 6B(26)  ug/mL 1.76 >41.4 R 2.9 R   Serotype 10A(34)  ug/mL 1.60 4.9 R 0.2 Low  R   Serotype 11A(43)  ug/mL 2.58 >7.6 R 0.2 Low  R   Serotype 7F(51)  ug/mL 10.52 >13.6 R 0.6 Low  R   Serotype 15B(54)  ug/mL 0.70 2.4 R 1.5 R   Serotype 18C(56)  ug/mL 4.56 >8.1 R 0.2 Low  R   Serotype 19A(57)  ug/mL 1.03 11.0 R 1.6 R    Serotype 9V(68)  ug/mL 6.30 >13.4 R 0.1 Low  R   Serotype 33F(70)  ug/mL 2.20 4.9 R, CM 0.2 Low  R, CM   Pneumo Serotype Interpretation See Note            Interpreted By:  Dion Taveras and Beyersdorf Conner   STUDY:  CT CHEST HIGH RESOLUTION;  9/25/2024 1:45 pm      INDICATION:  Signs/Symptoms:severe asthma, steroid dependent and on biologic..      ,J45.50 Severe persistent asthma, uncomplicated (Multi)      COMPARISON:  None.      ACCESSION NUMBER(S):  [id number]      ORDERING CLINICIAN:  DEBBY TYSON      TECHNIQUE:  Using helical multidetector technique, volumetric data acquisition of  the chest was obtained without intravenous administration of contrast  material under the high resolution chest CT protocol. Examination  includes contiguous slices through the chest in supine positioning  during inspiration, noncontiguous axial slices in supine positioning  during expiration and prone positioning during inspiration.      FINDINGS:  LUNGS AND AIRWAYS:  The trachea and central airways are patent. No endobronchial lesion  is seen.Diffuse bronchial wall thickening consistent with chronic  inflammation seen in reactive airways disease. There are branching  centrilobular ground-glass nodules and tree-in-bud nodularity in the  bilateral lower lobes which may represent atypical infection or  bronchiolitis. No pleural effusion or pneumothorax.  Mild biapical scarring.  Expiratory imaging demonstrates extensive basilar air trapping.  Prone imaging reveals improvement of bibasilar atelectasis and no  evidence of pulmonary fibrosis.      MEDIASTINUM AND JOSE ALBERTO, LOWER NECK AND AXILLA:  The visualized thyroid gland is within normal limits.  No evidence of thoracic lymphadenopathy by CT criteria.  Esophagus appears within normal limits as seen.      HEART AND VESSELS:  The thoracic aorta normal in course and caliber.  Main pulmonary artery and its branches are normal in caliber.  No coronary artery  calcifications are seen. Please note, the study is  not optimized for evaluation of coronary arteries. The cardiac  chambers are not enlarged. There is no pericardial effusion seen.      UPPER ABDOMEN:  The visualized subdiaphragmatic structures demonstrate no remarkable  findings.          CHEST WALL AND OSSEOUS STRUCTURES:  Chest wall is within normal limits.  No acute osseous pathology.There are no suspicious osseous lesions.      IMPRESSION:  1. Centrilobular ground-glass and tree-in-bud nodularity in the  bilateral lower lobes with diffuse bronchial wall thickening,  consistent with bronchiolitis secondary to reactive airways disease.  A component of aspiration pneumonitis or infectious  bronchiolitis/atypical infection can not be excluded.  2. No evidence to suggest pulmonary fibrosis.  3. Expiratory air trapping consistent with a history of asthma.      I personally reviewed the image(s)/study and resident interpretation.  I agree with the findings as stated by resident Radu Oneill.  Data analyzed and images interpreted at [facility], [place], [place].      MACRO:  None      Signed by: Dion Taveras 9/25/2024 5:56 PM  Dictation workstation:   [id number]  ACT 15    Assessment/Plan   Eden is a 40 yo with a history of allergy, severe asthma.  We reviewed labs and imaging. She is continuing on Breztri, Tezspire. She did not benefit from Singulair.  She felt improved post Amoxicillin and has required recurrent oral prednisone. I have recommended consultation with Pulmonary physician.  Follow up in 4-5 wks. Will have Tezspire next week.  Leonela Simon DO

## 2024-10-29 ENCOUNTER — APPOINTMENT (OUTPATIENT)
Dept: ALLERGY | Facility: CLINIC | Age: 39
End: 2024-10-29
Payer: COMMERCIAL

## 2024-10-29 DIAGNOSIS — J45.50 SEVERE PERSISTENT ASTHMA, UNSPECIFIED WHETHER COMPLICATED (MULTI): ICD-10-CM

## 2024-10-29 PROCEDURE — 96372 THER/PROPH/DIAG INJ SC/IM: CPT | Performed by: ALLERGY & IMMUNOLOGY

## 2024-11-13 ENCOUNTER — APPOINTMENT (OUTPATIENT)
Dept: ALLERGY | Facility: CLINIC | Age: 39
End: 2024-11-13
Payer: COMMERCIAL

## 2024-11-26 ENCOUNTER — APPOINTMENT (OUTPATIENT)
Dept: ALLERGY | Facility: CLINIC | Age: 39
End: 2024-11-26
Payer: COMMERCIAL

## 2024-11-26 VITALS
RESPIRATION RATE: 17 BRPM | DIASTOLIC BLOOD PRESSURE: 64 MMHG | WEIGHT: 150 LBS | TEMPERATURE: 97.7 F | SYSTOLIC BLOOD PRESSURE: 108 MMHG | BODY MASS INDEX: 23.54 KG/M2 | OXYGEN SATURATION: 98 % | HEIGHT: 67 IN | HEART RATE: 73 BPM

## 2024-11-26 DIAGNOSIS — J45.50 SEVERE PERSISTENT ASTHMA, UNSPECIFIED WHETHER COMPLICATED (MULTI): ICD-10-CM

## 2024-11-26 DIAGNOSIS — K59.09 CHRONIC CONSTIPATION: ICD-10-CM

## 2024-11-26 PROCEDURE — 96372 THER/PROPH/DIAG INJ SC/IM: CPT | Performed by: ALLERGY & IMMUNOLOGY

## 2024-11-26 PROCEDURE — 3008F BODY MASS INDEX DOCD: CPT | Performed by: ALLERGY & IMMUNOLOGY

## 2024-11-26 PROCEDURE — 99214 OFFICE O/P EST MOD 30 MIN: CPT | Performed by: ALLERGY & IMMUNOLOGY

## 2024-11-26 RX ORDER — FLUTICASONE FUROATE, UMECLIDINIUM BROMIDE AND VILANTEROL TRIFENATATE 200; 62.5; 25 UG/1; UG/1; UG/1
POWDER RESPIRATORY (INHALATION) DAILY
COMMUNITY

## 2024-11-26 NOTE — PROGRESS NOTES
Patient ID: Eden Quispe is a 39 y.o. female.     Chief Complaint: follow up  History Of Present Illness  Eden Quispe is a 39 y.o. female with PMx severe asthma presenting for follow up.     Saw Dr. Carr.  Changed her from Breztri to Trelegy. Taking albuterol prior to Trelegy dose.  Did take a Zpack.  Seeing him again in 2 wks and will do a PFT on 12/3. She was also put back on an oral steroid for 2 wks.  Continues on Tezspire.  No problems with this and seems to be helping.  No er visits.        Review of Systems    Pertinent positives and negatives have been assessed in the HPI. All other systems have been reviewed and are negative except as noted in the HPI.    Allergies  Patient has no known allergies.    Past Medical History  She has a past medical history of Allergic, Anxiety, and Asthma ([facility]).    Family History  Family History   Problem Relation Name Age of Onset    No Known Problems Mother      No Known Problems Father         Surgical History  She has a past surgical history that includes Other surgical history (08/08/2019); Other surgical history (08/08/2019); and Pelvic laparoscopy.    Social/Environmental History  She reports that she has never smoked. She has never been exposed to tobacco smoke. She has never used smokeless tobacco. She reports current alcohol use of about 2.0 standard drinks of alcohol per week. She reports that she does not use drugs.  MEDICATIONS  Current Outpatient Medications on File Prior to Visit   Medication Sig Dispense Refill    albuterol 90 mcg/actuation inhaler Inhale 2 puffs every 4 hours if needed for wheezing.      albuterol-budesonide (Airsupra) 90-80 mcg/actuation inhaler Inhale 2 puffs every 6 hours if needed.      busPIRone (Buspar) 7.5 mg tablet TAKE ONE TABLET BY MOUTH THREE TIMES A DAY (MORNING, EVENING, AND BEFORE BEDTIME) 270 tablet 1    carbinoxamine maleate 4 mg tablet TAKE ONE TABLET BY MOUTH EVERY 12HRS      cholecalciferol (Vitamin D3) 25 MCG  "(1000 UT) capsule Take 1 capsule (25 mcg) by mouth once daily.      cyanocobalamin (Vitamin B-12) 100 mcg tablet Take 1 tablet (100 mcg) by mouth every other day.      fluticasone (Flonase) 50 mcg/actuation nasal spray Administer 2 sprays into each nostril once daily. Shake gently. Before first use, prime pump. After use, clean tip and replace cap. 16 g 5    fluticasone-umeclidin-vilanter (Trelegy Ellipta) 200-62.5-25 mcg blister with device Inhale once daily.      ipratropium-albuteroL (Duo-Neb) 0.5-2.5 mg/3 mL nebulizer solution Take 3 mL by nebulization every 6 hours. 180 mL 11    meloxicam (Mobic) 15 mg tablet Take 1 tablet (15 mg) by mouth once daily. 30 tablet 1    tezepelumab-ekko (Tezspire) SubQ Pen Injector Inject 1.9 mL (210 mg) under the skin every 30 (thirty) days. 1.91 mL 11    [DISCONTINUED] Breztri Aerosphere 160-9-4.8 mcg/actuation HFA aerosol inhaler Inhale 2 puffs every 12 hours.      linaCLOtide (Linzess) 145 mcg capsule Take 1 capsule (145 mcg) by mouth once daily in the morning. Take before meals for 7 days. Do not crush or chew. 7 capsule 0    Linzess 145 mcg capsule Take 1 capsule (145 mcg) by mouth once daily. 90 capsule 0     No current facility-administered medications on file prior to visit.         Physical Exam  Visit Vitals  /64   Pulse 73   Temp 36.5 °C (97.7 °F) (Temporal)   Resp 17   Ht 1.702 m (5' 7\")   Wt 68 kg (150 lb)   SpO2 98%   BMI 23.49 kg/m²   OB Status Having periods   Smoking Status Never   BSA 1.79 m²       Wt Readings from Last 1 Encounters:   11/26/24 68 kg (150 lb)       Physical Exam    General: Well appearing, no acute distress  Head: Normocephalic, atraumatic, neck supple without lymphadenopathy  Eyes: EOMI, non-injected  Ears: TM's with normal visible land marks. Dry cerumen in canals bilaterally  Nose: No nasal crease, nares patent, slightly boggy turbinates, minimal discharge  Throat: Normal dentition, no erythema  Heart: Regular rate and rhythm  Lungs: " Clear to auscultation bilaterally, effort normal  Extremities: Moves all extremities symmetrically, no edema  Skin: No rashes/lesions  Psych: normal mood and affect    LAB RESULTS:  CBC:  Recent Labs     04/13/24  0928 12/20/22  1543 09/23/22  1347 03/15/22  1402   WBC 8.6 5.8 6.1 6.2   HGB 12.8 12.9 12.7 13.0   HCT 39.8 39.7 39.4 40.9    157 181 164   MCV 88 84 87 88   EOSABS  --  0.09 0.08 0.22       CMP:  Recent Labs     04/13/24  0928 12/20/22  1543 09/23/22  1347    137 138   K 3.9 3.9 3.8    102 102   CO2 25 28 26   ANIONGAP 13 11 14   BUN 16 15 14   CREATININE 0.80 0.94 0.91   EGFR >90  --   --    MG 1.98  --   --      Recent Labs     04/13/24 0928 12/20/22  1543 09/23/22  1347   ALBUMIN 4.6 4.4 4.5   ALKPHOS 66 60 48   ALT 22 13 12   AST 21 16 17   BILITOT 0.4 0.3 0.5       ALLERGY:   Lab Results   Component Value Date    ICIGE 26.8 08/13/2024    WHITEASH <0.10 08/13/2024    SILVERBIRCH <0.10 08/13/2024    BOXELDER <0.10 08/13/2024    MOUNTJUNIPER <0.10 08/13/2024    COTTONWOOD <0.10 08/13/2024    ELM <0.10 08/13/2024    MULBERRY <0.10 08/13/2024    PECANHICKORY <0.10 08/13/2024    MAPLESYCAMOR <0.10 08/13/2024    OAK <0.10 08/13/2024    BERMUDAGR <0.10 08/13/2024    JOHNSONGR <0.10 08/13/2024    BLUEGRASS <0.10 08/13/2024    TIMOTHYGRASS <0.10 08/13/2024     Lab Results   Component Value Date    LAMBQUART <0.10 08/13/2024    PIGWEED <0.10 08/13/2024    COMRAGWEED <0.10 08/13/2024    RUSSIANT <0.10 08/13/2024    SHEEPSOR <0.10 08/13/2024    PLANTAIN <0.10 08/13/2024    CATEPI <0.10 08/13/2024    DOGEPI 0.25 (Equiv IgE) 08/13/2024    ALTERNA 0.72 (Mod) 08/13/2024    CLADHERB <0.10 08/13/2024    ICA04 <0.10 08/13/2024    PENICILLIUM <0.10 08/13/2024    DERMFAR 0.27 (Equiv IgE) 08/13/2024    DERMPTE 0.24 (Equiv IgE) 08/13/2024    COCKR <0.10 08/13/2024       Recent Labs     08/13/24  1315   ICIGE 26.8     Recent Labs     12/20/22  1543 09/23/22  1347 03/15/22  1402   EOSABS 0.09 0.08 0.22      IMMUNO:   Recent Labs     08/13/24  1315 03/15/22  1402   IGG 1,040 1,070    205   IGM 90 86     HEME/ENDO:  Recent Labs     04/13/24  0928 06/24/20  1459 09/23/19  0946   TSH 1.50 1.57 0.83   HGBA1C 5.1  --   --      ACT 18    Procedure: Pt here for routine subcutaneous Tezspire injection. Pt received 210mg in L arm, tolerating the procedure well without any adverse reaction. Afsaneh Shin LPN     Assessment/Plan   Eden is a 40 yo woman with allergic rhinitis to dust mite dog and mold. She also recently saw Dr. Carr who changed her from [facility] to [facility]. She is improved s/p a couple weeks of steroids and a Zpack. She is doing well on Tezspire without side effects.  Continue current medication and follow up in 3 months  Leonela Simon DO

## 2024-11-27 RX ORDER — LINACLOTIDE 145 UG/1
CAPSULE, GELATIN COATED ORAL
Qty: 90 CAPSULE | Refills: 0 | Status: SHIPPED | OUTPATIENT
Start: 2024-11-27

## 2024-12-13 DIAGNOSIS — F41.9 ANXIETY: ICD-10-CM

## 2024-12-13 RX ORDER — BUSPIRONE HYDROCHLORIDE 7.5 MG/1
TABLET ORAL
Qty: 270 TABLET | Refills: 1 | Status: SHIPPED | OUTPATIENT
Start: 2024-12-13

## 2024-12-31 ENCOUNTER — APPOINTMENT (OUTPATIENT)
Dept: ALLERGY | Facility: CLINIC | Age: 39
End: 2024-12-31
Payer: COMMERCIAL

## 2024-12-31 DIAGNOSIS — J45.50 SEVERE PERSISTENT ASTHMA, UNSPECIFIED WHETHER COMPLICATED (MULTI): ICD-10-CM

## 2024-12-31 PROCEDURE — 96372 THER/PROPH/DIAG INJ SC/IM: CPT | Performed by: ALLERGY & IMMUNOLOGY

## 2025-01-07 ENCOUNTER — APPOINTMENT (OUTPATIENT)
Dept: OBSTETRICS AND GYNECOLOGY | Facility: CLINIC | Age: 40
End: 2025-01-07
Payer: COMMERCIAL

## 2025-01-08 ENCOUNTER — APPOINTMENT (OUTPATIENT)
Dept: OBSTETRICS AND GYNECOLOGY | Facility: CLINIC | Age: 40
End: 2025-01-08
Payer: COMMERCIAL

## 2025-01-08 VITALS
SYSTOLIC BLOOD PRESSURE: 135 MMHG | WEIGHT: 165 LBS | DIASTOLIC BLOOD PRESSURE: 79 MMHG | HEIGHT: 67 IN | BODY MASS INDEX: 25.9 KG/M2

## 2025-01-08 DIAGNOSIS — Z01.419 WELL WOMAN EXAM WITH ROUTINE GYNECOLOGICAL EXAM: Primary | ICD-10-CM

## 2025-01-08 DIAGNOSIS — Z12.31 ENCOUNTER FOR SCREENING MAMMOGRAM FOR MALIGNANT NEOPLASM OF BREAST: ICD-10-CM

## 2025-01-08 PROCEDURE — 3008F BODY MASS INDEX DOCD: CPT

## 2025-01-08 PROCEDURE — 99396 PREV VISIT EST AGE 40-64: CPT

## 2025-01-08 PROCEDURE — 87624 HPV HI-RISK TYP POOLED RSLT: CPT

## 2025-01-08 PROCEDURE — 1036F TOBACCO NON-USER: CPT

## 2025-01-08 NOTE — PROGRESS NOTES
"Subjective   Eden Quispe is a 39 y.o. female who is here for a routine exam.     GYN & Sexual Hx.:   - Last pap , normal HPV neg.  - History of abnormal Pap smear: no  - Contraception: None, would be OK with a pregnancy if one were to occur.  - Menstrual Periods: Regular, monthly.    OB Hx.:       7 and 9 year old at home.  Both vaginal births.    Regular self breast exam: yes  Last leno 2024, Cat 2.  Was being followed for breast asymmetry in the left breast.  History of abnormal mammogram: yes - As above.  Family history of breast cancer: Unknown, patient was adopted.     Living situation: with family, Occupation:  Works from home , Tobacco/alcohol/caffeine: no tobacco use and alcohol intake:social drinker, and Illicit drugs: no history of illicit drug use    Diet: general  Exercise: regularly as directed    Review of Systems   All other systems reviewed and are negative.      Objective   /79 (BP Location: Right arm, Patient Position: Sitting, BP Cuff Size: Adult)   Ht 1.702 m (5' 7\")   Wt 74.8 kg (165 lb)   LMP 2025   BMI 25.84 kg/m²      General:   alert and oriented, in no acute distress           Lungs: Normal respiratory effort.   Breasts: Soft, symmetric, no skin dimpling or nipple discharge, no palpable masses or axillary nodes.   Abdomen: soft, non-tender, without masses or organomegaly   Vulva: normal, Bartholin's, Urethra, [place]'s normal   Vagina: normal mucosa, normal discharge   Cervix: no lesions           Neuro: age-appropriate affect, behavior and speech, no gross motor deficits, normal gait     Assessment      39 y.o.  woman for annual GYN exam.     - Health Maintenance --> Routine follow up with PCP for health maintenance examination encouraged, including TSH, cholesterol, and Vit. D evaluation.  Self breast exam encouraged; concerning characteristics of breasts reviewed - Pt. will report general concerns, any adherent lumps, skin dimpling/puckering or color " changes, and any nipple discharge.  Order for mammogram placed and patient advised to schedule.   Diet and exercise reviewed.   Pap done today - Reviewed ACOG and ASCCP guidelines with pt.  If normal and HPV negative, will repeat in 5 years.     - F/U 1 year or as needed.    RUFINO Duron-DINAHM

## 2025-01-16 ENCOUNTER — HOSPITAL ENCOUNTER (OUTPATIENT)
Dept: RADIOLOGY | Facility: CLINIC | Age: 40
Discharge: HOME | End: 2025-01-16
Payer: COMMERCIAL

## 2025-01-16 DIAGNOSIS — Z12.31 ENCOUNTER FOR SCREENING MAMMOGRAM FOR MALIGNANT NEOPLASM OF BREAST: ICD-10-CM

## 2025-01-16 PROCEDURE — 77067 SCR MAMMO BI INCL CAD: CPT

## 2025-01-29 ENCOUNTER — TELEPHONE (OUTPATIENT)
Dept: PRIMARY CARE | Facility: CLINIC | Age: 40
End: 2025-01-29
Payer: COMMERCIAL

## 2025-01-29 NOTE — TELEPHONE ENCOUNTER
Returned voicemail to schedule an appointment.    Voicemail left requesting patient call back or make a request through [facility]

## 2025-02-11 ENCOUNTER — APPOINTMENT (OUTPATIENT)
Dept: PRIMARY CARE | Facility: CLINIC | Age: 40
End: 2025-02-11
Payer: COMMERCIAL

## 2025-02-11 VITALS
DIASTOLIC BLOOD PRESSURE: 71 MMHG | SYSTOLIC BLOOD PRESSURE: 116 MMHG | BODY MASS INDEX: 25.84 KG/M2 | HEART RATE: 87 BPM | WEIGHT: 165 LBS

## 2025-02-11 DIAGNOSIS — I87.2 CHRONIC VENOUS INSUFFICIENCY OF LOWER EXTREMITY: Primary | ICD-10-CM

## 2025-02-11 DIAGNOSIS — I83.93 SPIDER VEINS OF BOTH LOWER EXTREMITIES: ICD-10-CM

## 2025-02-11 PROCEDURE — 1036F TOBACCO NON-USER: CPT | Performed by: INTERNAL MEDICINE

## 2025-02-11 PROCEDURE — 99213 OFFICE O/P EST LOW 20 MIN: CPT | Performed by: INTERNAL MEDICINE

## 2025-02-11 NOTE — PROGRESS NOTES
Chief Complaints:  Seen for follow-up after 6 months of procedures.    HPI:  39 years old female who is active has had bilateral chronic venous insufficiency and was successfully treated came for a follow-up visit.  Since her last visit with me she had had the surface vein treatments.  She says that she is planning to go back to the surface vein treatment this he also.  She has some symptoms which includes some achiness and burning.  She says that some of the achiness would treated as muscle spasms with the physical therapy and has some improvement but she still has symptoms.  She wears the stockings as much as possible.  She is also active.  Her leg swellings are much improved.    ROS:  Respiratory:  No shortness of breath.  No cough, sinus congestion    Cardiovascular:    No chest pain.  No claudication.  No cyanosis.  No palpitations, denies.    Neurologic:    No tingling/Numbness.  No loss of strength.    Social History:  Tobacco Use:  None    Current Outpatient Medications on File Prior to Visit   Medication Sig Dispense Refill    albuterol 90 mcg/actuation inhaler Inhale 2 puffs every 4 hours if needed for wheezing.      albuterol-budesonide (Airsupra) 90-80 mcg/actuation inhaler Inhale 2 puffs every 6 hours if needed.      busPIRone (Buspar) 7.5 mg tablet TAKE ONE TABLET BY MOUTH THREE TIMES A DAY (MORNING, EVENING, AND BEFORE BEDTIME) 270 tablet 1    carbinoxamine maleate 4 mg tablet TAKE ONE TABLET BY MOUTH EVERY 12HRS      cholecalciferol (Vitamin D3) 25 MCG (1000 UT) capsule Take 1 capsule (25 mcg) by mouth once daily.      cyanocobalamin (Vitamin B-12) 100 mcg tablet Take 1 tablet (100 mcg) by mouth every other day.      fluticasone (Flonase) 50 mcg/actuation nasal spray Administer 2 sprays into each nostril once daily. Shake gently. Before first use, prime pump. After use, clean tip and replace cap. 16 g 5    fluticasone-umeclidin-vilanter (Trelegy Ellipta) 200-62.5-25 mcg blister with device Inhale  once daily.      ipratropium-albuteroL (Duo-Neb) 0.5-2.5 mg/3 mL nebulizer solution Take 3 mL by nebulization every 6 hours. 180 mL 11    linaCLOtide (Linzess) 145 mcg capsule Take 1 capsule (145 mcg) by mouth once daily in the morning. Take before meals for 7 days. Do not crush or chew. 7 capsule 0    Linzess 145 mcg capsule TAKE ONE CAPSULE BY MOUTH ONCE DAILY (AARON) 90 capsule 0    tezepelumab-ekko (Tezspire) SubQ Pen Injector Inject 1.9 mL (210 mg) under the skin every 30 (thirty) days. 1.91 mL 11    meloxicam (Mobic) 15 mg tablet Take 1 tablet (15 mg) by mouth once daily. (Patient not taking: Reported on 2/11/2025) 30 tablet 1     No current facility-administered medications on file prior to visit.        No Known Allergies     Examination:    Visit Vitals  /71 (BP Location: Left arm, Patient Position: Sitting)   Pulse 87   Wt 74.8 kg (165 lb)   BMI 25.84 kg/m²   OB Status Having periods   Smoking Status Never   BSA 1.88 m²        Normal-built, well-nourished  with no apparent distress. Alert oriented  Skin:  Normal turgor.  No rash.  Head:  Normocephalic, atraumatic.  Eyes:  Pupils are equal, round,.  No pallor of conjunctivae.  Mucous membranes are moist.  Neck:  Supple.  No JVD.  No carotid bruit.  No thyromegaly. No cervical lymphadenopathy.  No clubbing  Chest:  Vesicular breathing Bilaterally good air entry.  No wheezing.  No crackles.  Heart:  Regular rate and rhythm.  S1, S2 positive.  No murmur.  Extremities:  Bilaterally no pedal pitting edema.  Bilaterally 2+ dorsalis pedis pulses.  No calf tenderness. Homans sign is negative.  Neuro Exam: No focal signs. Gait is normal.     Venous Exam:  Varicose veins in left calf [absent  Varicose veins in left thigh [absent  Varicose veins in right calf [absent  Varicose veins in right thigh absent  Reticular veins in left calf [present  Reticular veins in left thigh [present  Reticular veins in right calf [present  Reticular veins in right thigh  [present  Telangiectasias in left calf [present  Telangiectasias in left thigh [present  Telangiectasias in right calf [present].  Telangiectasias in right thigh [present].  Right leg edema [absent  Left leg edema [absent  Hyperpigmentation in left leg [absent  Hyperpigmentation in right leg [absent  Lipodermatosclerosis in right leg absent].  Lipodermatosclerosis in left leg [absent  Dermatitis in left leg [absent  Dermatitis in right leg [absent  Corona phlebectatica in left leg [absent  Corona phlebectatica in right leg [absent  Atrophe davida in left leg [absent  Atrophe davida in right leg [absent  Ulcer(s) in left leg [absent  Ulcer(s) in right leg [absent        Left leg CEAP C1rS  Right leg CEAP C1rS      Diagnosis/ Problems    Assessment/Plan :  Problem List Items Addressed This Visit       Chronic venous insufficiency of lower extremity - Primary    Relevant Orders    Vascular US Lower Extremity Vein Mapping Bilateral    Spider veins of both lower extremities    Relevant Orders    Vascular US Lower Extremity Vein Mapping Bilateral       No orders of the defined types were placed in this encounter.      Plan     Chronic venous insufficiency of bilateral lower extremities with other complications  Because of patient's concerns with the symptoms and continued evidence of surface veins on exam we will do a detailed ultrasound evaluation to decide any further interventions which are appropriate.    Discussions  Patient has significant improvement from the bilateral leg venous congestions patient also following the appropriate instructions including wearing the graduated compression stockings and lifestyle modifications.    Patient understands to continue to wear the graduated compression stockings and lifestyle modifications as we have discussed in the past and today.    Patient also will follow-up with dermatologist to have the cosmetic treatment done.

## 2025-02-14 DIAGNOSIS — K59.09 CHRONIC CONSTIPATION: ICD-10-CM

## 2025-02-14 RX ORDER — LINACLOTIDE 145 UG/1
CAPSULE, GELATIN COATED ORAL
Qty: 90 CAPSULE | Refills: 0 | Status: SHIPPED | OUTPATIENT
Start: 2025-02-14

## 2025-02-20 ENCOUNTER — APPOINTMENT (OUTPATIENT)
Dept: PRIMARY CARE | Facility: CLINIC | Age: 40
End: 2025-02-20
Payer: COMMERCIAL

## 2025-02-20 VITALS
HEIGHT: 67 IN | OXYGEN SATURATION: 98 % | HEART RATE: 85 BPM | SYSTOLIC BLOOD PRESSURE: 142 MMHG | BODY MASS INDEX: 25.9 KG/M2 | DIASTOLIC BLOOD PRESSURE: 79 MMHG | WEIGHT: 165 LBS

## 2025-02-20 DIAGNOSIS — J45.50 SEVERE PERSISTENT ASTHMA WITHOUT COMPLICATION (MULTI): Primary | ICD-10-CM

## 2025-02-20 DIAGNOSIS — E78.49 OTHER HYPERLIPIDEMIA: ICD-10-CM

## 2025-02-20 DIAGNOSIS — Z91.09 HOUSE DUST MITE ALLERGY: ICD-10-CM

## 2025-02-20 DIAGNOSIS — Z00.00 ROUTINE GENERAL MEDICAL EXAMINATION AT A HEALTH CARE FACILITY: ICD-10-CM

## 2025-02-20 DIAGNOSIS — E55.9 VITAMIN D DEFICIENCY: ICD-10-CM

## 2025-02-20 PROCEDURE — 1036F TOBACCO NON-USER: CPT | Performed by: FAMILY MEDICINE

## 2025-02-20 PROCEDURE — 99396 PREV VISIT EST AGE 40-64: CPT | Performed by: FAMILY MEDICINE

## 2025-02-20 PROCEDURE — 3008F BODY MASS INDEX DOCD: CPT | Performed by: FAMILY MEDICINE

## 2025-02-20 RX ORDER — MONTELUKAST SODIUM 10 MG/1
10 TABLET ORAL NIGHTLY
Qty: 90 TABLET | Refills: 1 | Status: SHIPPED | OUTPATIENT
Start: 2025-02-20 | End: 2025-08-19

## 2025-02-20 ASSESSMENT — PATIENT HEALTH QUESTIONNAIRE - PHQ9
SUM OF ALL RESPONSES TO PHQ QUESTIONS 1-9: 1
SUM OF ALL RESPONSES TO PHQ9 QUESTIONS 1 AND 2: 0
1. LITTLE INTEREST OR PLEASURE IN DOING THINGS: NOT AT ALL
6. FEELING BAD ABOUT YOURSELF - OR THAT YOU ARE A FAILURE OR HAVE LET YOURSELF OR YOUR FAMILY DOWN: NOT AT ALL
4. FEELING TIRED OR HAVING LITTLE ENERGY: NOT AT ALL
3. TROUBLE FALLING OR STAYING ASLEEP OR SLEEPING TOO MUCH: NOT AT ALL
7. TROUBLE CONCENTRATING ON THINGS, SUCH AS READING THE NEWSPAPER OR WATCHING TELEVISION: NOT AT ALL
9. THOUGHTS THAT YOU WOULD BE BETTER OFF DEAD, OR OF HURTING YOURSELF: NOT AT ALL
8. MOVING OR SPEAKING SO SLOWLY THAT OTHER PEOPLE COULD HAVE NOTICED. OR THE OPPOSITE, BEING SO FIGETY OR RESTLESS THAT YOU HAVE BEEN MOVING AROUND A LOT MORE THAN USUAL: NOT AT ALL
2. FEELING DOWN, DEPRESSED OR HOPELESS: NOT AT ALL
5. POOR APPETITE OR OVEREATING: SEVERAL DAYS
10. IF YOU CHECKED OFF ANY PROBLEMS, HOW DIFFICULT HAVE THESE PROBLEMS MADE IT FOR YOU TO DO YOUR WORK, TAKE CARE OF THINGS AT HOME, OR GET ALONG WITH OTHER PEOPLE: NOT DIFFICULT AT ALL

## 2025-02-20 NOTE — PROGRESS NOTES
Subjective   Patient ID: Eden Quispe is a 39 y.o. female [id number] who presents for Establish Care (New patient to establish care).    HPI     Pt is here to establish care    Pt is on Buspar 7.5 TID for anxiety , for last few years.  Medication working well.  Taking B12 and Vit D    Severe Asthma : Pt is taking Tezspire once month shot for Asthma and Allergy for last 1 year. Also taking Carbonoxamine 4 mg .   Started on  Trelegy for last 2 months.  Last asthma Flare  12/2024.   Had 3-4 asthma flare in last 1 year - gaining weight due to Oral prednisone.  Pt allergic to environmental dust , pet dander, maple trees.  Pt is following Pulmonary and Allergy and immunology Doctor.  Gaining weight due to  recurrent Prednisone use.   More bloating, constipation on Laxatives    Pilates and weightlifting for exercise.   Gained a lot of weight due to oral prednisone for recurrent flare.  Unable to lose weight.   Currently not following any specific diet.  Following GYN for Pap and mammogram.    Allergy test reviewed          Immunization History   Administered Date(s) Administered    COVID-19, mRNA, LNP-S, PF, 30 mcg/0.3 mL dose 03/23/2021, 04/21/2021, 11/20/2021    DTaP vaccine, pediatric  (INFANRIX) 02/15/2016    Flu vaccine (IIV4), preservative free *Check age/dose* 09/27/2013, 10/19/2015, 09/17/2018, 09/21/2021, 09/23/2022, 09/11/2023    Flu vaccine, quadrivalent, no egg protein, age 6 month or greater (FLUCELVAX) 09/19/2020    Influenza, injectable, MDCK, quadrivalent 09/25/2019    Influenza, seasonal, injectable 09/01/2020    Pneumococcal polysaccharide vaccine, 23-valent, age 2 years and older (PNEUMOVAX 23) 07/27/2022    Tdap vaccine, age 7 year and older (BOOSTRIX, ADACEL) 06/05/2009, 11/25/2013       Past Medical History:   Diagnosis Date    Anxiety     Asthma        Social History     Tobacco Use    Smoking status: Never     Passive exposure: Never    Smokeless tobacco: Never   Vaping Use    Vaping status: Never  Used   Substance Use Topics    Alcohol use: Yes     Alcohol/week: 2.0 standard drinks of alcohol     Types: 2 Glasses of wine per week    Drug use: Never       Family History   Problem Relation Name Age of Onset    No Known Problems Mother      No Known Problems Father         Recent Surgeries in Family Medicine            No cases to display            Current Outpatient Medications   Medication Sig Dispense Refill    albuterol 90 mcg/actuation inhaler Inhale 2 puffs every 4 hours if needed for wheezing.      albuterol-budesonide (Airsupra) 90-80 mcg/actuation inhaler Inhale 2 puffs every 6 hours if needed.      busPIRone (Buspar) 7.5 mg tablet TAKE ONE TABLET BY MOUTH THREE TIMES A DAY (MORNING, EVENING, AND BEFORE BEDTIME) 270 tablet 1    carbinoxamine maleate 4 mg tablet TAKE ONE TABLET BY MOUTH EVERY 12HRS      cholecalciferol (Vitamin D3) 25 MCG (1000 UT) capsule Take 1 capsule (25 mcg) by mouth once daily.      cyanocobalamin (Vitamin B-12) 100 mcg tablet Take 1 tablet (100 mcg) by mouth every other day.      fluticasone (Flonase) 50 mcg/actuation nasal spray Administer 2 sprays into each nostril once daily. Shake gently. Before first use, prime pump. After use, clean tip and replace cap. 16 g 5    fluticasone-umeclidin-vilanter (Trelegy Ellipta) 200-62.5-25 mcg blister with device Inhale once daily.      ipratropium-albuteroL (Duo-Neb) 0.5-2.5 mg/3 mL nebulizer solution Take 3 mL by nebulization every 6 hours. 180 mL 11    Linzess 145 mcg capsule TAKE ONE CAPSULE BY MOUTH ONCE DAILY (AARON) 90 capsule 0    tezepelumab-ekko (Tezspire) SubQ Pen Injector Inject 1.9 mL (210 mg) under the skin every 30 (thirty) days. 1.91 mL 11    linaCLOtide (Linzess) 145 mcg capsule Take 1 capsule (145 mcg) by mouth once daily in the morning. Take before meals for 7 days. Do not crush or chew. 7 capsule 0     No current facility-administered medications for this visit.       Physical Exam  /79   Pulse 85   Ht 1.702 m (5'  "7\")   Wt 74.8 kg (165 lb)   SpO2 98%   BMI 25.84 kg/m²     No Known Allergies    General Appearance:  Alert, cooperative, no distress,   Head:  Normocephalic, atraumatic   Eyes:  PERRL, conjunctiva/corneas clear, EOM's intact,    Lungs:   Clear to auscultation bilaterally, respirations unlabored   Heart:  Regular rate and rhythm, S1 and S2 normal, no murmur,    Abdomen:   Soft, non-tender, bowel sounds active all four quadrants,  no masses, no organomegaly   Extremities: Extremities normal, No edema   Neurologic: Normal        Assessment/Plan   Diagnoses and all orders for this visit:  Severe persistent asthma without complication (Multi)  -     montelukast (Singulair) 10 mg tablet; Take 1 tablet (10 mg) by mouth once daily at bedtime.  Routine general medical examination at a health care facility  -     TSH with reflex to Free T4 if abnormal; Future  -     Lipid Panel; Future  -     Comprehensive Metabolic Panel; Future  -     CBC; Future  -     Urinalysis with Reflex Microscopic; Future  Other hyperlipidemia  -     CBC; Future  House dust mite allergy  Vitamin D deficiency  -     Vitamin D 25-Hydroxy,Total (for eval of Vitamin D levels); Future    Patient was advised to get complete blood work  Severe persistent asthma  Continue Tezspire shots  Continue carbinoxamine  Add Singulair  Patient was advised to consider gluten-free dairy free diet  Continue vitamin D  Will consider pro [place] by [place] natural and vitamin C if needed  Patient was advised to consider food allergy testing  Follow-up pulmonary  Follow-up allergy immunology April 2025    Chronic anxiety  Continue BuSpar 3 times daily  Continue B12    Chronic constipation patient on Linzess    Following GYN for Pap and mammogram        "

## 2025-02-26 ENCOUNTER — APPOINTMENT (OUTPATIENT)
Dept: ALLERGY | Facility: CLINIC | Age: 40
End: 2025-02-26
Payer: COMMERCIAL

## 2025-03-02 LAB
25(OH)D3+25(OH)D2 SERPL-MCNC: 29 NG/ML (ref 30–100)
ALBUMIN SERPL-MCNC: 4.5 G/DL (ref 3.6–5.1)
ALP SERPL-CCNC: 63 U/L (ref 31–125)
ALT SERPL-CCNC: 13 U/L (ref 6–29)
ANION GAP SERPL CALCULATED.4IONS-SCNC: 7 MMOL/L (CALC) (ref 7–17)
APPEARANCE UR: ABNORMAL
AST SERPL-CCNC: 15 U/L (ref 10–30)
BILIRUB SERPL-MCNC: 0.5 MG/DL (ref 0.2–1.2)
BILIRUB UR QL STRIP: NEGATIVE
BUN SERPL-MCNC: 17 MG/DL (ref 7–25)
CALCIUM SERPL-MCNC: 9.4 MG/DL (ref 8.6–10.2)
CHLORIDE SERPL-SCNC: 106 MMOL/L (ref 98–110)
CHOLEST SERPL-MCNC: 243 MG/DL
CHOLEST/HDLC SERPL: 2.6 (CALC)
CO2 SERPL-SCNC: 25 MMOL/L (ref 20–32)
COLOR UR: YELLOW
CREAT SERPL-MCNC: 0.75 MG/DL (ref 0.5–0.99)
EGFRCR SERPLBLD CKD-EPI 2021: 103 ML/MIN/1.73M2
ERYTHROCYTE [DISTWIDTH] IN BLOOD BY AUTOMATED COUNT: 12.2 % (ref 11–15)
GLUCOSE SERPL-MCNC: 85 MG/DL (ref 65–99)
GLUCOSE UR QL STRIP: NEGATIVE
HCT VFR BLD AUTO: 38 % (ref 35–45)
HDLC SERPL-MCNC: 93 MG/DL
HGB BLD-MCNC: 12.8 G/DL (ref 11.7–15.5)
HGB UR QL STRIP: NEGATIVE
KETONES UR QL STRIP: NEGATIVE
LDLC SERPL CALC-MCNC: 135 MG/DL (CALC)
LEUKOCYTE ESTERASE UR QL STRIP: NEGATIVE
MCH RBC QN AUTO: 28.7 PG (ref 27–33)
MCHC RBC AUTO-ENTMCNC: 33.7 G/DL (ref 32–36)
MCV RBC AUTO: 85.2 FL (ref 80–100)
NITRITE UR QL STRIP: NEGATIVE
NONHDLC SERPL-MCNC: 150 MG/DL (CALC)
PH UR STRIP: 5.5 [PH] (ref 5–8)
PLATELET # BLD AUTO: 181 THOUSAND/UL (ref 140–400)
PMV BLD REES-ECKER: 11.3 FL (ref 7.5–12.5)
POTASSIUM SERPL-SCNC: 4.5 MMOL/L (ref 3.5–5.3)
PROT SERPL-MCNC: 7.1 G/DL (ref 6.1–8.1)
PROT UR QL STRIP: NEGATIVE
RBC # BLD AUTO: 4.46 MILLION/UL (ref 3.8–5.1)
SODIUM SERPL-SCNC: 138 MMOL/L (ref 135–146)
SP GR UR STRIP: 1.02 (ref 1–1.03)
TRIGL SERPL-MCNC: 63 MG/DL
TSH SERPL-ACNC: 1.3 MIU/L
WBC # BLD AUTO: 4.4 THOUSAND/UL (ref 3.8–10.8)

## 2025-03-03 ENCOUNTER — APPOINTMENT (OUTPATIENT)
Dept: CARDIOLOGY | Facility: CLINIC | Age: 40
End: 2025-03-03
Payer: COMMERCIAL

## 2025-03-03 VITALS
HEIGHT: 67 IN | HEART RATE: 68 BPM | SYSTOLIC BLOOD PRESSURE: 118 MMHG | WEIGHT: 166 LBS | BODY MASS INDEX: 26.06 KG/M2 | DIASTOLIC BLOOD PRESSURE: 70 MMHG

## 2025-03-03 DIAGNOSIS — Q23.1 AORTIC REGURGITATION, CONGENITAL (HHS-HCC): Primary | ICD-10-CM

## 2025-03-03 PROCEDURE — 1036F TOBACCO NON-USER: CPT | Performed by: INTERNAL MEDICINE

## 2025-03-03 PROCEDURE — 99213 OFFICE O/P EST LOW 20 MIN: CPT | Performed by: INTERNAL MEDICINE

## 2025-03-03 PROCEDURE — 3008F BODY MASS INDEX DOCD: CPT | Performed by: INTERNAL MEDICINE

## 2025-03-03 NOTE — PROGRESS NOTES
"Chief Complaint:   Please see below.     History Of Present Illness:    Eden Quispe is a 39 y.o. female presenting with aortic regurgitation.    The patient overall feels well.  The patient denies chest discomfort, dyspnea, palpitations, orthopnea, PND, syncope, and near syncope.  The patient's echocardiogram dated 4/2023  revealed an EF of 55-60%, with trivial aortic regurgitation.    PMH: asthmatic bronchitis     Last Recorded Vitals:  Vitals:    03/03/25 1300   BP: 118/70   BP Location: Left arm   Patient Position: Sitting   Pulse: 68   Weight: 75.3 kg (166 lb)   Height: 1.702 m (5' 7\")       Past Medical History:  She has a past medical history of Anxiety and Asthma.    She has no past medical history of Personal history of irradiation.    Past Surgical History:  She has a past surgical history that includes Other surgical history (08/08/2019); Other surgical history (08/08/2019); and Pelvic laparoscopy.      Social History:  She reports that she has never smoked. She has never been exposed to tobacco smoke. She has never used smokeless tobacco. She reports current alcohol use of about 2.0 standard drinks of alcohol per week. She reports that she does not use drugs.    Family History:  Family History   Adopted: Yes   Problem Relation Name Age of Onset    No Known Problems Mother      No Known Problems Father          Allergies:  Patient has no known allergies.    Outpatient Medications:  Current Outpatient Medications   Medication Instructions    albuterol 90 mcg/actuation inhaler 2 puffs, Every 4 hours PRN    albuterol-budesonide (Airsupra) 90-80 mcg/actuation inhaler 2 puffs, Every 6 hours PRN    busPIRone (Buspar) 7.5 mg tablet TAKE ONE TABLET BY MOUTH THREE TIMES A DAY (MORNING, EVENING, AND BEFORE BEDTIME)    carbinoxamine maleate 4 mg tablet TAKE ONE TABLET BY MOUTH EVERY 12HRS    cholecalciferol (Vitamin D3) 25 MCG (1000 UT) capsule 1 capsule, Daily    cyanocobalamin (Vitamin B-12) 100 mcg tablet 1 " tablet, Every other day    fluticasone (Flonase) 50 mcg/actuation nasal spray 2 sprays, Each Nostril, Daily, Shake gently. Before first use, prime pump. After use, clean tip and replace cap.    fluticasone-umeclidin-vilanter (Trelegy Ellipta) 200-62.5-25 mcg blister with device Daily    ipratropium-albuteroL (Duo-Neb) 0.5-2.5 mg/3 mL nebulizer solution 3 mL, nebulization, Every 6 hours RT    Linzess 145 mcg capsule TAKE ONE CAPSULE BY MOUTH ONCE DAILY (AARON)    montelukast (SINGULAIR) 10 mg, oral, Nightly    Tezspire 210 mg, subcutaneous, Every 30 days       Physical Exam:  CHEST: Clear to auscultation  CARDIAC: Regular rhythm and rate, normal S1 and S2, no murmur rub or gallop; carotids are brisk without bruits, PMI is not displaced  ABDOMEN: Active bowel sounds, no tenderness, no evidence of ascites  EXTREMITIES: No clubbing, cyanosis, edema, or tenderness       Last Labs:  CBC -  Lab Results   Component Value Date    WBC 4.4 03/01/2025    HGB 12.8 03/01/2025    HCT 38.0 03/01/2025    MCV 85.2 03/01/2025     03/01/2025       CMP -  Lab Results   Component Value Date    CALCIUM 9.4 03/01/2025    PROT 7.1 03/01/2025    ALBUMIN 4.5 03/01/2025    AST 15 03/01/2025    ALT 13 03/01/2025    ALKPHOS 63 03/01/2025    BILITOT 0.5 03/01/2025       LIPID PANEL -   Lab Results   Component Value Date    CHOL 243 (H) 03/01/2025    TRIG 63 03/01/2025    HDL 93 03/01/2025    CHHDL 2.6 03/01/2025    LDLF 102 (H) 12/02/2019    VLDL 12 04/13/2024    NHDL 150 (H) 03/01/2025       RENAL FUNCTION PANEL -   Lab Results   Component Value Date    GLUCOSE 85 03/01/2025     03/01/2025    K 4.5 03/01/2025     03/01/2025    CO2 25 03/01/2025    ANIONGAP 7 03/01/2025    BUN 17 03/01/2025    CREATININE 0.75 03/01/2025    CALCIUM 9.4 03/01/2025    ALBUMIN 4.5 03/01/2025        Lab Results   Component Value Date    HGBA1C 5.1 04/13/2024         Lab review: I have Chemistry CMP:   Lab Results   Component Value Date    ALBUMIN 4.5  03/01/2025    CALCIUM 9.4 03/01/2025    CO2 25 03/01/2025    CREATININE 0.75 03/01/2025    GLUCOSE 85 03/01/2025    BILITOT 0.5 03/01/2025    PROT 7.1 03/01/2025    ALT 13 03/01/2025    AST 15 03/01/2025    ALKPHOS 63 03/01/2025   , Chemistry BMP   Lab Results   Component Value Date    GLUCOSE 85 03/01/2025    CALCIUM 9.4 03/01/2025    CO2 25 03/01/2025    CREATININE 0.75 03/01/2025   , CBC:  Lab Results   Component Value Date    WBC 4.4 03/01/2025    RBC 4.46 03/01/2025    HGB 12.8 03/01/2025    HCT 38.0 03/01/2025    MCV 85.2 03/01/2025    MCH 28.7 03/01/2025    MCHC 33.7 03/01/2025    RDW 12.2 03/01/2025    MPV 11.3 03/01/2025    NRBC 0.0 04/13/2024   , and Lipids:   Lab Results   Component Value Date    CHOL 243 (H) 03/01/2025    HDL 93 03/01/2025    LDLCALC 135 (H) 03/01/2025    TRIG 63 03/01/2025       Assessment/Plan   Assessment & Plan  Aortic regurgitation, congenital ([facility])           Follow up on an as-needed basis.    Malik Veliz MD

## 2025-03-03 NOTE — PATIENT INSTRUCTIONS

## 2025-03-04 ENCOUNTER — TELEPHONE (OUTPATIENT)
Dept: PRIMARY CARE | Facility: CLINIC | Age: 40
End: 2025-03-04
Payer: COMMERCIAL

## 2025-03-04 NOTE — TELEPHONE ENCOUNTER
Pt aware of results, she currently does take a vitamin D supplement daily wants to know if she should take a high dose vitamin D supplement she also does work out on treadmill daily and is concerned about the elevated lipids. Please advise

## 2025-03-04 NOTE — TELEPHONE ENCOUNTER
----- Message from Jens Pérez sent at 3/3/2025  4:37 PM EST -----  Mildly dec Vitamin D. Take daily OTC vitamin D and recheck in 2-3 months.     Elevated lipids. Recommend low fat, low carb diet and 30 minutes of aerobic exercise at least 3 times weekly. Follow up in 6-12 months for repeat lipids.

## 2025-03-05 DIAGNOSIS — K59.09 CHRONIC CONSTIPATION: ICD-10-CM

## 2025-03-05 DIAGNOSIS — J45.50 SEVERE PERSISTENT ASTHMA WITHOUT COMPLICATION (MULTI): Primary | ICD-10-CM

## 2025-03-11 RX ORDER — FLUTICASONE FUROATE, UMECLIDINIUM BROMIDE AND VILANTEROL TRIFENATATE 200; 62.5; 25 UG/1; UG/1; UG/1
1 POWDER RESPIRATORY (INHALATION) DAILY
Qty: 30 EACH | Refills: 3 | Status: SHIPPED | OUTPATIENT
Start: 2025-03-11

## 2025-03-26 ENCOUNTER — APPOINTMENT (OUTPATIENT)
Dept: ALLERGY | Facility: CLINIC | Age: 40
End: 2025-03-26
Payer: COMMERCIAL

## 2025-03-26 VITALS
DIASTOLIC BLOOD PRESSURE: 66 MMHG | OXYGEN SATURATION: 97 % | BODY MASS INDEX: 26.06 KG/M2 | HEART RATE: 74 BPM | SYSTOLIC BLOOD PRESSURE: 112 MMHG | TEMPERATURE: 97.9 F | HEIGHT: 67 IN | RESPIRATION RATE: 17 BRPM | WEIGHT: 166 LBS

## 2025-03-26 DIAGNOSIS — J45.50 SEVERE PERSISTENT ASTHMA, UNSPECIFIED WHETHER COMPLICATED (MULTI): Primary | ICD-10-CM

## 2025-03-26 DIAGNOSIS — J30.9 ALLERGIC RHINITIS, UNSPECIFIED SEASONALITY, UNSPECIFIED TRIGGER: ICD-10-CM

## 2025-03-26 PROCEDURE — 96160 PT-FOCUSED HLTH RISK ASSMT: CPT | Performed by: ALLERGY & IMMUNOLOGY

## 2025-03-26 PROCEDURE — 99214 OFFICE O/P EST MOD 30 MIN: CPT | Performed by: ALLERGY & IMMUNOLOGY

## 2025-03-26 PROCEDURE — 3008F BODY MASS INDEX DOCD: CPT | Performed by: ALLERGY & IMMUNOLOGY

## 2025-03-26 RX ORDER — PREDNISONE 20 MG/1
20 TABLET ORAL 2 TIMES DAILY
Qty: 10 TABLET | Refills: 0 | Status: SHIPPED | OUTPATIENT
Start: 2025-03-26 | End: 2025-03-31

## 2025-03-26 RX ORDER — AZITHROMYCIN 250 MG/1
TABLET, FILM COATED ORAL
Qty: 6 TABLET | Refills: 0 | Status: SHIPPED | OUTPATIENT
Start: 2025-03-26 | End: 2025-03-31

## 2025-03-26 RX ORDER — ALBUTEROL SULFATE AND BUDESONIDE 90; 80 UG/1; UG/1
2 AEROSOL, METERED RESPIRATORY (INHALATION) 4 TIMES DAILY PRN
Qty: 10.7 G | Refills: 3 | Status: SHIPPED | OUTPATIENT
Start: 2025-03-26 | End: 2025-04-25

## 2025-03-26 ASSESSMENT — ENCOUNTER SYMPTOMS
CARDIOVASCULAR NEGATIVE: 1
EYES NEGATIVE: 1
COUGH: 1
GASTROINTESTINAL NEGATIVE: 1
ENDOCRINE NEGATIVE: 1
MUSCULOSKELETAL NEGATIVE: 1
CONSTITUTIONAL NEGATIVE: 1
RHINORRHEA: 1

## 2025-03-26 NOTE — PROGRESS NOTES
Patient ID: Eden Quispe is a 39 y.o. female.     Chief Complaint: follow up  History Of Present Illness  Eden Quispe is a 39 y.o. female with PMx severe asthma presenting for follow up.     Trelegy is working well.  Daughter had a cold and patient afraid she is starting to have exacerbation.  Symptoms present for about 5 days.  Noted a tiny bit of wheeze in the last couple.  Mild rhinorrhea, no fever, overall feeling well.    In January 2025, saw Dr. Carr.  Also following with Dr. Carr.    Review of Systems   Constitutional: Negative.    HENT:  Positive for rhinorrhea.    Eyes: Negative.    Respiratory:  Positive for cough.    Cardiovascular: Negative.    Gastrointestinal: Negative.    Endocrine: Negative.    Musculoskeletal: Negative.    Skin: Negative.        Pertinent positives and negatives have been assessed in the HPI. All other systems have been reviewed and are negative except as noted in the HPI.    Allergies  Patient has no known allergies.    Past Medical History  She has a past medical history of Anxiety and Asthma.    She has no past medical history of Personal history of irradiation.    Family History  Family History   Adopted: Yes   Problem Relation Name Age of Onset    No Known Problems Mother      No Known Problems Father         Surgical History  She has a past surgical history that includes Other surgical history (08/08/2019); Other surgical history (08/08/2019); and Pelvic laparoscopy.    Social/Environmental History  She reports that she has never smoked. She has never been exposed to tobacco smoke. She has never used smokeless tobacco. She reports current alcohol use of about 2.0 standard drinks of alcohol per week. She reports that she does not use drugs.  MEDICATIONS  Current Outpatient Medications on File Prior to Visit   Medication Sig Dispense Refill    albuterol 90 mcg/actuation inhaler Inhale 2 puffs every 4 hours if needed for wheezing.      albuterol-budesonide (Airsupra)  "90-80 mcg/actuation inhaler Inhale 2 puffs every 6 hours if needed.      busPIRone (Buspar) 7.5 mg tablet TAKE ONE TABLET BY MOUTH THREE TIMES A DAY (MORNING, EVENING, AND BEFORE BEDTIME) 270 tablet 1    carbinoxamine maleate 4 mg tablet TAKE ONE TABLET BY MOUTH EVERY 12HRS      cholecalciferol (Vitamin D3) 25 MCG (1000 UT) capsule Take 1 capsule (25 mcg) by mouth once daily.      cyanocobalamin (Vitamin B-12) 100 mcg tablet Take 1 tablet (100 mcg) by mouth every other day.      fluticasone (Flonase) 50 mcg/actuation nasal spray Administer 2 sprays into each nostril once daily. Shake gently. Before first use, prime pump. After use, clean tip and replace cap. 16 g 5    fluticasone-umeclidin-vilanter (Trelegy Ellipta) 200-62.5-25 mcg blister with device Inhale 1 puff once daily. 30 each 3    linaCLOtide (Linzess) 145 mcg capsule Take 1 capsule (145 mcg) by mouth once daily in the morning. Take before meals. Do not crush or chew. 90 capsule 1    montelukast (Singulair) 10 mg tablet Take 1 tablet (10 mg) by mouth once daily at bedtime. 90 tablet 1    tezepelumab-ekko (Tezspire) SubQ Pen Injector Inject 1.9 mL (210 mg) under the skin every 30 (thirty) days. 1.91 mL 11    ipratropium-albuteroL (Duo-Neb) 0.5-2.5 mg/3 mL nebulizer solution Take 3 mL by nebulization every 6 hours. 180 mL 11     No current facility-administered medications on file prior to visit.     Physical Exam  Visit Vitals  /66   Pulse 74   Temp 36.6 °C (97.9 °F) (Temporal)   Resp 17   Ht 1.702 m (5' 7\")   Wt 75.3 kg (166 lb)   SpO2 97%   BMI 26.00 kg/m²   OB Status Having periods   Smoking Status Never   BSA 1.89 m²       Wt Readings from Last 1 Encounters:   03/26/25 75.3 kg (166 lb)       Physical Exam    General: Well appearing, no acute distress  Head: Normocephalic, atraumatic, neck supple without lymphadenopathy  Eyes: EOMI, non-injected  Ears: TM's with normal visible land marks.   Nose: No nasal crease, nares patent, slightly boggy " turbinates, minimal discharge  Throat: Normal dentition, no erythema  Heart: Regular rate and rhythm  Lungs: Clear to auscultation bilaterally, effort normal. Mild dry cough  Extremities: Moves all extremities symmetrically, no edema  Skin: No rashes/lesions  Psych: normal mood and affect    LAB RESULTS:  CBC:  Recent Labs     03/01/25  1102 04/13/24  0928 12/20/22  1543 09/23/22  1347 03/15/22  1402   WBC 4.4 8.6 5.8 6.1 6.2   HGB 12.8 12.8 12.9 12.7 13.0   HCT 38.0 39.8 39.7 39.4 40.9    221 157 181 164   MCV 85.2 88 84 87 88   EOSABS  --   --  0.09 0.08 0.22       CMP:  Recent Labs     03/01/25  1102 04/13/24  0928 12/20/22  1543    137 137   K 4.5 3.9 3.9    103 102   CO2 25 25 28   ANIONGAP 7 13 11   BUN 17 16 15   CREATININE 0.75 0.80 0.94   EGFR 103 >90  --    MG  --  1.98  --      Recent Labs     03/01/25  1102 04/13/24  0928 12/20/22  1543   ALBUMIN 4.5 4.6 4.4   ALKPHOS 63 66 60   ALT 13 22 13   AST 15 21 16   BILITOT 0.5 0.4 0.3       ALLERGY:   Lab Results   Component Value Date    ICIGE 26.8 08/13/2024    WHITEASH <0.10 08/13/2024    SILVERBIRCH <0.10 08/13/2024    BOXELDER <0.10 08/13/2024    MOUNTJUNIPER <0.10 08/13/2024    COTTONWOOD <0.10 08/13/2024    ELM <0.10 08/13/2024    MULBERRY <0.10 08/13/2024    PECANHICKORY <0.10 08/13/2024    MAPLESYCAMOR <0.10 08/13/2024    OAK <0.10 08/13/2024    BERMUDAGR <0.10 08/13/2024    JOHNSONGR <0.10 08/13/2024    BLUEGRASS <0.10 08/13/2024    TIMOTHYGRASS <0.10 08/13/2024     Lab Results   Component Value Date    LAMBQUART <0.10 08/13/2024    PIGWEED <0.10 08/13/2024    COMRAGWEED <0.10 08/13/2024    RUSSIANT <0.10 08/13/2024    SHEEPSOR <0.10 08/13/2024    PLANTAIN <0.10 08/13/2024    CATEPI <0.10 08/13/2024    DOGEPI 0.25 (Equiv IgE) 08/13/2024    ALTERNA 0.72 (Mod) 08/13/2024    CLADHERB <0.10 08/13/2024    ICA04 <0.10 08/13/2024    PENICILLIUM <0.10 08/13/2024    DERMFAR 0.27 (Equiv IgE) 08/13/2024    DERMPTE 0.24 (Equiv IgE) 08/13/2024     COCKR <0.10 08/13/2024       Recent Labs     08/13/24  1315   ICIGE 26.8     Recent Labs     12/20/22  1543 09/23/22  1347 03/15/22  1402   EOSABS 0.09 0.08 0.22     IMMUNO:   Recent Labs     08/13/24  1315 03/15/22  1402   IGG 1,040 1,070    205   IGM 90 86     HEME/ENDO:  Recent Labs     03/01/25  1102 04/13/24  0928 06/24/20  1459   TSH 1.30 1.50 1.57   HGBA1C  --  5.1  --      ACT - 23       Assessment/Plan   Eden is a 38 yo woman with allergic rhinitis to dust mite dog and mold. She is doing well on Tezspire without side effects. We discussed consideration of prophylaxis with Zithromax.  Zpack today. Does not need steroids, but will have on hand for emergency. Discontinue Singulair as this has not been effective in the past and she is on Tezspire.  No additional allergy testing needed today.  Continue current medication and follow up in 3 months  Leonela Simon DO

## 2025-04-01 ENCOUNTER — TELEPHONE (OUTPATIENT)
Dept: PRIMARY CARE | Facility: CLINIC | Age: 40
End: 2025-04-01

## 2025-04-01 ENCOUNTER — OFFICE VISIT (OUTPATIENT)
Dept: PRIMARY CARE | Facility: CLINIC | Age: 40
End: 2025-04-01
Payer: COMMERCIAL

## 2025-04-01 VITALS
WEIGHT: 164 LBS | HEART RATE: 90 BPM | BODY MASS INDEX: 25.74 KG/M2 | SYSTOLIC BLOOD PRESSURE: 124 MMHG | OXYGEN SATURATION: 97 % | HEIGHT: 67 IN | DIASTOLIC BLOOD PRESSURE: 76 MMHG | TEMPERATURE: 97.6 F

## 2025-04-01 DIAGNOSIS — H61.22 IMPACTED CERUMEN OF LEFT EAR: ICD-10-CM

## 2025-04-01 DIAGNOSIS — R06.2 INSPIRATORY WHEEZING: ICD-10-CM

## 2025-04-01 DIAGNOSIS — J06.9 UPPER RESPIRATORY TRACT INFECTION, UNSPECIFIED TYPE: ICD-10-CM

## 2025-04-01 DIAGNOSIS — Z87.09 HISTORY OF ASTHMA: ICD-10-CM

## 2025-04-01 DIAGNOSIS — J06.9 UPPER RESPIRATORY TRACT INFECTION, UNSPECIFIED TYPE: Primary | ICD-10-CM

## 2025-04-01 DIAGNOSIS — H60.12 CELLULITIS OF LEFT EAR: ICD-10-CM

## 2025-04-01 PROCEDURE — 69210 REMOVE IMPACTED EAR WAX UNI: CPT | Performed by: STUDENT IN AN ORGANIZED HEALTH CARE EDUCATION/TRAINING PROGRAM

## 2025-04-01 PROCEDURE — 99214 OFFICE O/P EST MOD 30 MIN: CPT | Performed by: STUDENT IN AN ORGANIZED HEALTH CARE EDUCATION/TRAINING PROGRAM

## 2025-04-01 PROCEDURE — 3008F BODY MASS INDEX DOCD: CPT | Performed by: STUDENT IN AN ORGANIZED HEALTH CARE EDUCATION/TRAINING PROGRAM

## 2025-04-01 PROCEDURE — 1036F TOBACCO NON-USER: CPT | Performed by: STUDENT IN AN ORGANIZED HEALTH CARE EDUCATION/TRAINING PROGRAM

## 2025-04-01 RX ORDER — CEPHALEXIN 500 MG/1
500 CAPSULE ORAL 2 TIMES DAILY
Qty: 14 CAPSULE | Refills: 0 | Status: SHIPPED | OUTPATIENT
Start: 2025-04-01 | End: 2025-04-08

## 2025-04-01 RX ORDER — BROMPHENIRAMINE MALEATE, PSEUDOEPHEDRINE HYDROCHLORIDE, AND DEXTROMETHORPHAN HYDROBROMIDE 2; 30; 10 MG/5ML; MG/5ML; MG/5ML
5 SYRUP ORAL 4 TIMES DAILY PRN
Qty: 120 ML | Refills: 0 | Status: SHIPPED | OUTPATIENT
Start: 2025-04-01 | End: 2025-04-01 | Stop reason: SDUPTHER

## 2025-04-01 RX ORDER — BROMPHENIRAMINE MALEATE, PSEUDOEPHEDRINE HYDROCHLORIDE, AND DEXTROMETHORPHAN HYDROBROMIDE 2; 30; 10 MG/5ML; MG/5ML; MG/5ML
5 SYRUP ORAL 4 TIMES DAILY PRN
Qty: 120 ML | Refills: 0 | Status: SHIPPED | OUTPATIENT
Start: 2025-04-01 | End: 2025-04-11

## 2025-04-01 RX ORDER — METHYLPREDNISOLONE 4 MG/1
TABLET ORAL
Qty: 21 TABLET | Refills: 0 | Status: SHIPPED | OUTPATIENT
Start: 2025-04-01 | End: 2025-04-07

## 2025-04-01 NOTE — TELEPHONE ENCOUNTER
WILL YOU PLS RESEND THE SCRIPT FOR THE BROMPHENIRAMIE TO THE PHARMACY/  IT SAYS THE TRANSMISSION FAILED

## 2025-04-01 NOTE — PROGRESS NOTES
Subjective   Patient ID: Eden Quispe is a 40 y.o. female who presents for the following    Assessment/Plan   URI   L ear cellulitis   Inspiratory Wheezing   -No MRSA risk factors  -L sided inspiratory wheezing. SpO2 okay. No  subjective SOB/SIMMONS  PLAN  -Keflex BID x 7 days  -medrol dose pack rx given   -Declining in office Duonebs. Advised to use duonebs once home.   -Follow up with A&I    L Ear Cerumen Impaction  -Manual disimpaction performed without complication.       HPI  40F presents for acute sick visit.     Has been having URI symptoms since at least march 20, 2025. Saw her allergist last week and was put on a z pack and states that she has not had much improvement. States that she has associated dry cough, sinus pressure, congestion, headache and possible skin infection behind her L ear.     Has not done a flu or COVID test.     Denies fevers, weight loss, lightheadedness, dizziness, vision changes,  CP, SOB, palpitations, n/v/d, abd pain, black/bloody stools, arthralgias, mood disturbance, or new numbness/weakness/tingling in arms/legs/face.        Past Medical History:   Diagnosis Date    Anxiety     Asthma        Past Surgical History:   Procedure Laterality Date    OTHER SURGICAL HISTORY  08/08/2019    Laparoscopy    OTHER SURGICAL HISTORY  08/08/2019    Uterine surgery    PELVIC LAPAROSCOPY         Family History   Adopted: Yes   Problem Relation Name Age of Onset    No Known Problems Mother      No Known Problems Father         Social Drivers of Health     Tobacco Use: Low Risk  (4/1/2025)    Patient History     Smoking Tobacco Use: Never     Smokeless Tobacco Use: Never     Passive Exposure: Never   Alcohol Use: Not At Risk (12/11/2023)    AUDIT-C     Frequency of Alcohol Consumption: Monthly or less     Average Number of Drinks: 1 or 2     Frequency of Binge Drinking: Never   Financial Resource Strain: Not on file   Food Insecurity: Not on file   Transportation Needs: Not on file   Physical  "Activity: Not on file   Stress: Not on file   Social Connections: Not on file   Intimate Partner Violence: Not on file   Depression: Not at risk (2/20/2025)    PHQ-2     PHQ-2 Score: 0   Housing Stability: Not on file   Utilities: Not on file   Digital Equity: Not on file   Health Literacy: Not on file         Visit Vitals  /76   Pulse 101   Temp 36.4 °C (97.6 °F)   Ht 1.702 m (5' 7\")   Wt 74.4 kg (164 lb)   SpO2 97%   BMI 25.69 kg/m²   OB Status Having periods   Smoking Status Never   BSA 1.88 m²     PHYSICAL EXAM   Physical Exam       General: NAD. NCAT. Aox3   HEENT: PERRLA. EOMI. MMM. Nares patent bl. L ear with cerumen impaction - cleared by manual disimpaction. Post nasal drip without exudate or erythema.   Cardiovascular: Rate in 90s. No MRG. S1/S2 wnl.   Respiratory: L sided diffuse inspiratory wheeze. No acute respiratory distress.   GI: Soft, NT abdomen. BS present x 4.   MSK: ROM x 4. CTLS non-tender.   Extremities: No edema. Cap refill < 2 sec.   Skin: Possible cellulitis behind left ear.   Neuro: Aox3. Cranial Nerves grossly intact. Motor/sensory wnl.   Psych: Mood wnl.      REVIEW OF SYSTEMS   ROS IN HPI     No Known Allergies    Current Outpatient Medications   Medication Sig Dispense Refill    albuterol 90 mcg/actuation inhaler Inhale 2 puffs every 4 hours if needed for wheezing.      albuterol-budesonide (Airsupra) 90-80 mcg/actuation inhaler Inhale 2 puffs 4 times a day as needed (cough, wheeze, short of breath). 10.7 g 3    busPIRone (Buspar) 7.5 mg tablet TAKE ONE TABLET BY MOUTH THREE TIMES A DAY (MORNING, EVENING, AND BEFORE BEDTIME) 270 tablet 1    carbinoxamine maleate 4 mg tablet TAKE ONE TABLET BY MOUTH EVERY 12HRS      cholecalciferol (Vitamin D3) 25 MCG (1000 UT) capsule Take 1 capsule (25 mcg) by mouth once daily.      cyanocobalamin (Vitamin B-12) 100 mcg tablet Take 1 tablet (100 mcg) by mouth every other day.      fluticasone (Flonase) 50 mcg/actuation nasal spray Administer 2 " sprays into each nostril once daily. Shake gently. Before first use, prime pump. After use, clean tip and replace cap. 16 g 5    fluticasone-umeclidin-vilanter (Trelegy Ellipta) 200-62.5-25 mcg blister with device Inhale 1 puff once daily. 30 each 3    linaCLOtide (Linzess) 145 mcg capsule Take 1 capsule (145 mcg) by mouth once daily in the morning. Take before meals. Do not crush or chew. 90 capsule 1    tezepelumab-ekko (Tezspire) SubQ Pen Injector Inject 1.9 mL (210 mg) under the skin every 30 (thirty) days. 1.91 mL 11    ipratropium-albuteroL (Duo-Neb) 0.5-2.5 mg/3 mL nebulizer solution Take 3 mL by nebulization every 6 hours. 180 mL 11     No current facility-administered medications for this visit.       Objective     Office Visit on 02/20/2025   Component Date Value Ref Range Status    TSH W/REFLEX TO FT4 03/01/2025 1.30  mIU/L Final    CHOLESTEROL, TOTAL 03/01/2025 243 (H)  <200 mg/dL Final    HDL CHOLESTEROL 03/01/2025 93  > OR = 50 mg/dL Final    TRIGLYCERIDES 03/01/2025 63  <150 mg/dL Final    LDL-CHOLESTEROL 03/01/2025 135 (H)  mg/dL (calc) Final    CHOL/HDLC RATIO 03/01/2025 2.6  <5.0 (calc) Final    NON HDL CHOLESTEROL 03/01/2025 150 (H)  <130 mg/dL (calc) Final    GLUCOSE 03/01/2025 85  65 - 99 mg/dL Final    UREA NITROGEN (BUN) 03/01/2025 17  7 - 25 mg/dL Final    CREATININE 03/01/2025 0.75  0.50 - 0.99 mg/dL Final    EGFR 03/01/2025 103  > OR = 60 mL/min/1.73m2 Final    SODIUM 03/01/2025 138  135 - 146 mmol/L Final    POTASSIUM 03/01/2025 4.5  3.5 - 5.3 mmol/L Final    CHLORIDE 03/01/2025 106  98 - 110 mmol/L Final    CARBON DIOXIDE 03/01/2025 25  20 - 32 mmol/L Final    ELECTROLYTE BALANCE 03/01/2025 7  7 - 17 mmol/L (calc) Final    CALCIUM 03/01/2025 9.4  8.6 - 10.2 mg/dL Final    PROTEIN, TOTAL 03/01/2025 7.1  6.1 - 8.1 g/dL Final    ALBUMIN 03/01/2025 4.5  3.6 - 5.1 g/dL Final    BILIRUBIN, TOTAL 03/01/2025 0.5  0.2 - 1.2 mg/dL Final    ALKALINE PHOSPHATASE 03/01/2025 63  31 - 125 U/L Final     AST 03/01/2025 15  10 - 30 U/L Final    ALT 03/01/2025 13  6 - 29 U/L Final    WHITE BLOOD CELL COUNT 03/01/2025 4.4  3.8 - 10.8 Thousand/uL Final    RED BLOOD CELL COUNT 03/01/2025 4.46  3.80 - 5.10 Million/uL Final    HEMOGLOBIN 03/01/2025 12.8  11.7 - 15.5 g/dL Final    HEMATOCRIT 03/01/2025 38.0  35.0 - 45.0 % Final    MCV 03/01/2025 85.2  80.0 - 100.0 fL Final    MCH 03/01/2025 28.7  27.0 - 33.0 pg Final    MCHC 03/01/2025 33.7  32.0 - 36.0 g/dL Final    RDW 03/01/2025 12.2  11.0 - 15.0 % Final    PLATELET COUNT 03/01/2025 181  140 - 400 Thousand/uL Final    MPV 03/01/2025 11.3  7.5 - 12.5 fL Final    COLOR 03/01/2025 YELLOW  YELLOW Final    APPEARANCE 03/01/2025 TURBID (A)  CLEAR Final    SPECIFIC GRAVITY 03/01/2025 1.024  1.001 - 1.035 Final    PH 03/01/2025 5.5  5.0 - 8.0 Final    GLUCOSE 03/01/2025 NEGATIVE  NEGATIVE Final    BILIRUBIN 03/01/2025 NEGATIVE  NEGATIVE Final    KETONES 03/01/2025 NEGATIVE  NEGATIVE Final    OCCULT BLOOD 03/01/2025 NEGATIVE  NEGATIVE Final    PROTEIN 03/01/2025 NEGATIVE  NEGATIVE Final    NITRITE 03/01/2025 NEGATIVE  NEGATIVE Final    LEUKOCYTE ESTERASE 03/01/2025 NEGATIVE  NEGATIVE Final    VITAMIN D,25-OH,TOTAL,IA 03/01/2025 29 (L)  30 - 100 ng/mL Final   Office Visit on 01/08/2025   Component Date Value Ref Range Status    Case Report 01/08/2025    Final                    Value:Gynecologic Cytology                              Case: [id number]                                   Authorizing Provider:  Madhavi Maloney,        Collected:           01/08/2025 1439                                     APRN-CNM                                                                     Ordering Location:     [facility]     Received:            01/08/2025 1439                                     Center                                                                       First Screen:          SIOBHAN Ramirez                                                               Specimen:    ThinPrep Liquid-Based Pap-Imaging System Screen, CERVIX, SCREENING                         Final Cytological Interpretation 01/08/2025    Final                    Value:    A. THINPREP PAP CERVIX, SCREENING -     Specimen Adequacy  Satisfactory for evaluation; endocervical/transformation zone component is present    General Categorization  Negative for intraepithelial lesion or malignancy.    Descriptive Interpretation  Negative for intraepithelial lesion or malignancy              01/08/2025    Final                    Value:Slide(s) initially screened by SIOBHAN Pickard at [address] [id number]  By the signature on this report, the individual or group listed as making the Final Interpretation/Diagnosis certifies that they have reviewed this case.       ThinPrep Imaging System 01/08/2025    Final                    Value:This specimen has been analyzed by the ThinPrep Imaging System ([employer] an automated imaging and review system, which assists the laboratory in evaluating cells on ThinPrep Pap tests. Following automated imaging, selected fields from every slide were reviewed by a cytotechnologist and/or pathologist.        Educational Note 01/08/2025    Final                    Value:Cervical cytology is a screening procedure primarily for squamous cancers and precursors and has associated false-negative and false-positives results as evidenced by published data. Your patient's test should be interpreted in this context, together with the patient's history and clinical findings. Regular sampling and follow-up of unexplained clinical signs and symptoms are recommended to minimize false negative results.      Perform HPV HR test? 01/08/2025 Always (all interpretations)   Final    Include HPV Genotype? 01/08/2025 Yes   Final    LMP 01/08/2025 1/5/2025   Final    HPV, high-risk 01/08/2025 Negative  Negative Final    HPV Type 16 DNA 01/08/2025 Negative   Negative Final    HPV Type 18 DNA 01/08/2025 Negative  Negative Final    HPV non-Type 16 or 18 DNA 01/08/2025 Negative  Negative Final       Radiology: Reviewed imaging in powerchart.  Imaging  No results found.    Cardiology, Vascular, and Other Imaging  No other imaging results found for the past 7 days      Family History   Adopted: Yes   Problem Relation Name Age of Onset    No Known Problems Mother      No Known Problems Father       Social History     Socioeconomic History    Marital status:    Tobacco Use    Smoking status: Never     Passive exposure: Never    Smokeless tobacco: Never   Vaping Use    Vaping status: Never Used   Substance and Sexual Activity    Alcohol use: Yes     Alcohol/week: 2.0 standard drinks of alcohol     Types: 2 Glasses of wine per week    Drug use: Never    Sexual activity: Defer     Partners: Male     Birth control/protection: None     Past Medical History:   Diagnosis Date    Anxiety     Asthma      Past Surgical History:   Procedure Laterality Date    OTHER SURGICAL HISTORY  08/08/2019    Laparoscopy    OTHER SURGICAL HISTORY  08/08/2019    Uterine surgery    PELVIC LAPAROSCOPY         Charting was completed using voice recognition technology and may include unintended errors.

## 2025-04-02 ENCOUNTER — APPOINTMENT (OUTPATIENT)
Dept: PRIMARY CARE | Facility: CLINIC | Age: 40
End: 2025-04-02
Payer: COMMERCIAL

## 2025-04-02 ENCOUNTER — OFFICE VISIT (OUTPATIENT)
Dept: PRIMARY CARE | Facility: CLINIC | Age: 40
End: 2025-04-02
Payer: COMMERCIAL

## 2025-04-02 DIAGNOSIS — I87.2 CHRONIC VENOUS INSUFFICIENCY OF LOWER EXTREMITY: ICD-10-CM

## 2025-04-02 DIAGNOSIS — I83.93 SPIDER VEINS OF BOTH LOWER EXTREMITIES: ICD-10-CM

## 2025-04-02 PROCEDURE — 93970 EXTREMITY STUDY: CPT | Performed by: INTERNAL MEDICINE

## 2025-04-02 NOTE — PROGRESS NOTES
Venous Duplex      Indications:  Limb pain  Leg Edema    Sonographer: Beverly Marie RVT    TECHNIQUE:  Venous Duplex ultrasound spectral Doppler wave modality was performed with the patient in the supine and upright positions to determine the status of the deep and superficial systems of the right and left lower extremity. The common femoral, femoral and popliteal veins of the deep venous system were imaged. The superficial system was imaged entirely to include, but was not limited to, the saphenous-femoral junction (SFJ) down the greater saphenous vein from the groin to the ankle, and the saphenous-popliteal junction (SPJ), if present, at the popliteal fossa down the small saphenous vein (SSV) to the ankle. As indicated, the cranial extensions of the SSV (CESSV), the anterior accessory GSV (AAGSV), and the posterior accessory GSV (PAGSV) were also evaluated, if present. All areas meeting the criteria for venous reflux (500 milliseconds or greater in the saphenous veins and principle branches, 350 milliseconds in perforators and 1000 milliseconds in the deep system) were confirmed with spectral Doppler analysis and confirmatory images were documented:     FINDINGS ARE THE FOLLOWING:    RIGHT LEG:  There is no evidence of thrombus in the interrogated segments of the deep or superficial venous system. There is no evidence of deep venous reflux.     GSV dimensions: 4.1mm junction   proximal segment is not visualized   mid segment is not visualized  distal segment is not visualized  The Greater Saphenous Vein appears to be absent, consistent with previous treatment    SSV dimensions: junction not visualized   Mid segment is not visualized  The Small Saphenous Vein appears to be absent, consistent with previous treatment    ASV dimensions: 2.3mm   There is <0.5 seconds of reflux in the Anterior Saphenous Vein    There are multiple incompetent tributaries along the thigh and lower leg.    Trib1 dimensions: 2.5mm  Trib2  dimensions: 1.4mm  There is >0.5 seconds of reflux in the tributaries       LEFT LEG:  There is no evidence of thrombus in the interrogated segments of the deep or superficial venous system. There is no evidence of deep venous reflux.     GSV dimensions: 4.1mm junction   proximal segment is not visualized   mid segment is not visualized  distal segment is not visualized  The Greater Saphenous Vein appears to be absent, consistent with previous treatment    SSV dimensions: 3.0mm junction  2.8mm mid  There is >0.5 seconds of reflux in the Small Saphenous Vein    ASV dimensions: 2.1mm   There is <0.5 seconds of reflux in the Anterior Saphenous Vein        CONCLUSIONS:  There is no evidence of thrombus in the interrogated segments of the deep or superficial venous system in both legs.  No evidence of deep venous reflux.  No evidence of superficial truncal venous reflux in both legs.  Few incompetent superficial tributaries are seen in the RT LE as noted in the MAP measured less than 3 mm in diameter.

## 2025-04-07 DIAGNOSIS — J45.901 SEVERE ASTHMA WITH ACUTE EXACERBATION, UNSPECIFIED WHETHER PERSISTENT (HHS-HCC): Primary | ICD-10-CM

## 2025-04-07 DIAGNOSIS — B99.9 RECURRENT INFECTIONS: ICD-10-CM

## 2025-04-07 RX ORDER — AZITHROMYCIN 500 MG/1
500 TABLET, FILM COATED ORAL 3 TIMES WEEKLY
Qty: 12 TABLET | Refills: 2 | Status: SHIPPED | OUTPATIENT
Start: 2025-04-07 | End: 2025-07-06

## 2025-04-09 ENCOUNTER — APPOINTMENT (OUTPATIENT)
Dept: PRIMARY CARE | Facility: CLINIC | Age: 40
End: 2025-04-09
Payer: COMMERCIAL

## 2025-04-17 ENCOUNTER — APPOINTMENT (OUTPATIENT)
Dept: PRIMARY CARE | Facility: CLINIC | Age: 40
End: 2025-04-17
Payer: COMMERCIAL

## 2025-05-06 ENCOUNTER — APPOINTMENT (OUTPATIENT)
Dept: PRIMARY CARE | Facility: CLINIC | Age: 40
End: 2025-05-06
Payer: COMMERCIAL

## 2025-05-06 VITALS
DIASTOLIC BLOOD PRESSURE: 85 MMHG | WEIGHT: 166.4 LBS | OXYGEN SATURATION: 99 % | SYSTOLIC BLOOD PRESSURE: 121 MMHG | BODY MASS INDEX: 26.06 KG/M2 | HEART RATE: 69 BPM

## 2025-05-06 DIAGNOSIS — R63.5 ABNORMAL WEIGHT GAIN: ICD-10-CM

## 2025-05-06 DIAGNOSIS — J45.50 SEVERE PERSISTENT ASTHMA WITHOUT COMPLICATION (MULTI): ICD-10-CM

## 2025-05-06 DIAGNOSIS — E78.49 OTHER HYPERLIPIDEMIA: Primary | ICD-10-CM

## 2025-05-06 PROCEDURE — G0447 BEHAVIOR COUNSEL OBESITY 15M: HCPCS | Performed by: FAMILY MEDICINE

## 2025-05-06 PROCEDURE — 99213 OFFICE O/P EST LOW 20 MIN: CPT | Performed by: FAMILY MEDICINE

## 2025-05-06 ASSESSMENT — PATIENT HEALTH QUESTIONNAIRE - PHQ9
SUM OF ALL RESPONSES TO PHQ9 QUESTIONS 1 AND 2: 0
1. LITTLE INTEREST OR PLEASURE IN DOING THINGS: NOT AT ALL
2. FEELING DOWN, DEPRESSED OR HOPELESS: NOT AT ALL

## 2025-05-06 NOTE — PROGRESS NOTES
Subjective   Patient ID: Eden Quispe [id number] is a 40 y.o. female who presents for Follow-up (Follow up discuss cholesterol).    HPI       Social History[1]    Allergies[2]    Current Medications[3]    Physical Exam  /85   Pulse 69   Wt 75.5 kg (166 lb 6.4 oz)   SpO2 99%   BMI 26.06 kg/m²     General Appearance:  Alert, cooperative, no distress,   Head:  Normocephalic, atraumatic   Eyes:  PERRL, conjunctiva/corneas clear, EOM's intact,    Lungs:   Clear to auscultation bilaterally, respirations unlabored   Heart:  Regular rate and rhythm, S1 and S2 normal, no murmur,    Neurologic: Normal      Office Visit on 02/20/2025   Component Date Value Ref Range Status    TSH W/REFLEX TO FT4 03/01/2025 1.30  mIU/L Final    Comment:           Reference Range                         > or = 20 Years  0.40-4.50                              Pregnancy Ranges            First trimester    0.26-2.66            Second trimester   0.55-2.73            Third trimester    0.43-2.91      CHOLESTEROL, TOTAL 03/01/2025 243 (H)  <200 mg/dL Final    HDL CHOLESTEROL 03/01/2025 93  > OR = 50 mg/dL Final    TRIGLYCERIDES 03/01/2025 63  <150 mg/dL Final    LDL-CHOLESTEROL 03/01/2025 135 (H)  mg/dL (calc) Final    Comment: Reference range: <100     Desirable range <100 mg/dL for primary prevention;    <70 mg/dL for patients with CHD or diabetic patients   with > or = 2 CHD risk factors.     LDL-C is now calculated using the Manjit-Thomas   calculation, which is a validated novel method providing   better accuracy than the Friedewald equation in the   estimation of LDL-C.   Manjit MCOCRMICK et al. ALEXIS. 2013;310(19): 4428-9262   (http://education.[employer].com/faq/ICJ254)      CHOL/HDLC RATIO 03/01/2025 2.6  <5.0 (calc) Final    NON HDL CHOLESTEROL 03/01/2025 150 (H)  <130 mg/dL (calc) Final    Comment: For patients with diabetes plus 1 major ASCVD risk   factor, treating to a non-HDL-C goal of <100 mg/dL   (LDL-C of <70 mg/dL) is  considered a therapeutic   option.      GLUCOSE 03/01/2025 85  65 - 99 mg/dL Final    Comment:               Fasting reference interval         UREA NITROGEN (BUN) 03/01/2025 17  7 - 25 mg/dL Final    CREATININE 03/01/2025 0.75  0.50 - 0.99 mg/dL Final    EGFR 03/01/2025 103  > OR = 60 mL/min/1.73m2 Final    SODIUM 03/01/2025 138  135 - 146 mmol/L Final    POTASSIUM 03/01/2025 4.5  3.5 - 5.3 mmol/L Final    CHLORIDE 03/01/2025 106  98 - 110 mmol/L Final    CARBON DIOXIDE 03/01/2025 25  20 - 32 mmol/L Final    ELECTROLYTE BALANCE 03/01/2025 7  7 - 17 mmol/L (calc) Final    CALCIUM 03/01/2025 9.4  8.6 - 10.2 mg/dL Final    PROTEIN, TOTAL 03/01/2025 7.1  6.1 - 8.1 g/dL Final    ALBUMIN 03/01/2025 4.5  3.6 - 5.1 g/dL Final    BILIRUBIN, TOTAL 03/01/2025 0.5  0.2 - 1.2 mg/dL Final    ALKALINE PHOSPHATASE 03/01/2025 63  31 - 125 U/L Final    AST 03/01/2025 15  10 - 30 U/L Final    ALT 03/01/2025 13  6 - 29 U/L Final    WHITE BLOOD CELL COUNT 03/01/2025 4.4  3.8 - 10.8 Thousand/uL Final    RED BLOOD CELL COUNT 03/01/2025 4.46  3.80 - 5.10 Million/uL Final    HEMOGLOBIN 03/01/2025 12.8  11.7 - 15.5 g/dL Final    HEMATOCRIT 03/01/2025 38.0  35.0 - 45.0 % Final    MCV 03/01/2025 85.2  80.0 - 100.0 fL Final    MCH 03/01/2025 28.7  27.0 - 33.0 pg Final    MCHC 03/01/2025 33.7  32.0 - 36.0 g/dL Final    Comment: For adults, a slight decrease in the calculated MCHC  value (in the range of 30 to 32 g/dL) is most likely  not clinically significant; however, it should be  interpreted with caution in correlation with other  red cell parameters and the patient's clinical  condition.      RDW 03/01/2025 12.2  11.0 - 15.0 % Final    PLATELET COUNT 03/01/2025 181  140 - 400 Thousand/uL Final    MPV 03/01/2025 11.3  7.5 - 12.5 fL Final    COLOR 03/01/2025 YELLOW  YELLOW Final    APPEARANCE 03/01/2025 TURBID (A)  CLEAR Final    SPECIFIC GRAVITY 03/01/2025 1.024  1.001 - 1.035 Final    PH 03/01/2025 5.5  5.0 - 8.0 Final    GLUCOSE  03/01/2025 NEGATIVE  NEGATIVE Final    BILIRUBIN 03/01/2025 NEGATIVE  NEGATIVE Final    KETONES 03/01/2025 NEGATIVE  NEGATIVE Final    OCCULT BLOOD 03/01/2025 NEGATIVE  NEGATIVE Final    PROTEIN 03/01/2025 NEGATIVE  NEGATIVE Final    NITRITE 03/01/2025 NEGATIVE  NEGATIVE Final    LEUKOCYTE ESTERASE 03/01/2025 NEGATIVE  NEGATIVE Final    VITAMIN D,25-OH,TOTAL,IA 03/01/2025 29 (L)  30 - 100 ng/mL Final    Comment: Vitamin D Status         25-OH Vitamin D:     Deficiency:                    <20 ng/mL  Insufficiency:             20 - 29 ng/mL  Optimal:                 > or = 30 ng/mL     For 25-OH Vitamin D testing on patients on   D2-supplementation and patients for whom quantitation   of D2 and D3 fractions is required, the QuestAssureD(TM)  25-OH VIT D, (D2,D3), LC/MS/MS is recommended: order   code 80804 (patients >2yrs).     See Note 1     Note 1     For additional information, please refer to   http://education.[employer]/faq/SMZ858   (This link is being provided for informational/  educational purposes only.)         Assessment/Plan   {Assess/PlanSmartLinks:73320}    Problem List Items Addressed This Visit    None          [1]   Social History  Tobacco Use    Smoking status: Never     Passive exposure: Never    Smokeless tobacco: Never   Vaping Use    Vaping status: Never Used   Substance Use Topics    Alcohol use: Yes     Alcohol/week: 2.0 standard drinks of alcohol     Types: 2 Glasses of wine per week    Drug use: Never   [2] No Known Allergies  [3]   Current Outpatient Medications   Medication Sig Dispense Refill    albuterol 90 mcg/actuation inhaler Inhale 2 puffs every 4 hours if needed for wheezing.      azithromycin (Zithromax) 500 mg tablet Take 1 tablet (500 mg) by mouth 3 times a week. 12 tablet 2    busPIRone (Buspar) 7.5 mg tablet TAKE ONE TABLET BY MOUTH THREE TIMES A DAY (MORNING, EVENING, AND BEFORE BEDTIME) 270 tablet 1    carbinoxamine maleate 4 mg tablet TAKE ONE TABLET BY MOUTH EVERY  12HRS      cholecalciferol (Vitamin D3) 25 MCG (1000 UT) capsule Take 1 capsule (25 mcg) by mouth once daily.      cyanocobalamin (Vitamin B-12) 100 mcg tablet Take 1 tablet (100 mcg) by mouth every other day.      fluticasone (Flonase) 50 mcg/actuation nasal spray Administer 2 sprays into each nostril once daily. Shake gently. Before first use, prime pump. After use, clean tip and replace cap. (Patient taking differently: Administer 2 sprays into each nostril if needed. Shake gently. Before first use, prime pump. After use, clean tip and replace cap.) 16 g 5    fluticasone-umeclidin-vilanter (Trelegy Ellipta) 200-62.5-25 mcg blister with device Inhale 1 puff once daily. 30 each 3    linaCLOtide (Linzess) 145 mcg capsule Take 1 capsule (145 mcg) by mouth once daily in the morning. Take before meals. Do not crush or chew. 90 capsule 1    tezepelumab-ekko (Tezspire) SubQ Pen Injector Inject 1.9 mL (210 mg) under the skin every 30 (thirty) days. 1.91 mL 11    ipratropium-albuteroL (Duo-Neb) 0.5-2.5 mg/3 mL nebulizer solution Take 3 mL by nebulization every 6 hours. 180 mL 11     No current facility-administered medications for this visit.

## 2025-05-07 NOTE — PROGRESS NOTES
Subjective   Patient ID: Eden Quispe is a 40 y.o. female [id number] who presents for Follow-up (Follow up discuss cholesterol).    HPI     Patient here for follow-up.    Pt is on Buspar 7.5 TID for anxiety , for last few years.  Medication working well.  Taking B12 and Vit D    Severe Asthma : Pt is taking Tezspire once month shot for Asthma and Allergy for last 1 year. Also taking Carbonoxamine 4 mg .   Started on  Trelegy for last 2 months.  No more asthma flare in last 6 months.  Patient now off of oral prednisone  Patient is noticing difficulty losing weight despite of doing daily exercise and watching diet  Had 3-4 asthma flare in last 1 year  Pt allergic to environmental dust , pet dander, maple trees.  Pt is following Pulmonary and Allergy and immunology Doctor.    Pilates and weightlifting for exercise.   Gained a lot of weight due to oral prednisone for recurrent flare.  Unable to lose weight.    Following GYN for Pap and mammogram.          Immunization History   Administered Date(s) Administered    COVID-19, mRNA, LNP-S, PF, 30 mcg/0.3 mL dose 03/23/2021, 04/21/2021, 11/20/2021    DTaP vaccine, pediatric  (INFANRIX) 02/15/2016    Flu vaccine (IIV4), preservative free *Check age/dose* 09/27/2013, 10/19/2015, 09/17/2018, 09/21/2021, 09/23/2022, 09/11/2023    Flu vaccine, quadrivalent, no egg protein, age 6 month or greater (FLUCELVAX) 09/19/2020    Influenza, injectable, MDCK, quadrivalent 09/25/2019    Influenza, seasonal, injectable 09/01/2020    Pneumococcal polysaccharide vaccine, 23-valent, age 2 years and older (PNEUMOVAX 23) 07/27/2022    Tdap vaccine, age 7 year and older (BOOSTRIX, ADACEL) 06/05/2009, 11/25/2013       Past Medical History:   Diagnosis Date    Anxiety     Asthma        Social History     Tobacco Use    Smoking status: Never     Passive exposure: Never    Smokeless tobacco: Never   Vaping Use    Vaping status: Never Used   Substance Use Topics    Alcohol use: Yes     Alcohol/week:  2.0 standard drinks of alcohol     Types: 2 Glasses of wine per week    Drug use: Never       Family History   Adopted: Yes   Problem Relation Name Age of Onset    No Known Problems Mother      No Known Problems Father             Current Outpatient Medications   Medication Sig Dispense Refill    albuterol 90 mcg/actuation inhaler Inhale 2 puffs every 4 hours if needed for wheezing.      azithromycin (Zithromax) 500 mg tablet Take 1 tablet (500 mg) by mouth 3 times a week. 12 tablet 2    busPIRone (Buspar) 7.5 mg tablet TAKE ONE TABLET BY MOUTH THREE TIMES A DAY (MORNING, EVENING, AND BEFORE BEDTIME) 270 tablet 1    carbinoxamine maleate 4 mg tablet TAKE ONE TABLET BY MOUTH EVERY 12HRS      cholecalciferol (Vitamin D3) 25 MCG (1000 UT) capsule Take 1 capsule (25 mcg) by mouth once daily.      cyanocobalamin (Vitamin B-12) 100 mcg tablet Take 1 tablet (100 mcg) by mouth every other day.      fluticasone (Flonase) 50 mcg/actuation nasal spray Administer 2 sprays into each nostril once daily. Shake gently. Before first use, prime pump. After use, clean tip and replace cap. (Patient taking differently: Administer 2 sprays into each nostril if needed. Shake gently. Before first use, prime pump. After use, clean tip and replace cap.) 16 g 5    fluticasone-umeclidin-vilanter (Trelegy Ellipta) 200-62.5-25 mcg blister with device Inhale 1 puff once daily. 30 each 3    linaCLOtide (Linzess) 145 mcg capsule Take 1 capsule (145 mcg) by mouth once daily in the morning. Take before meals. Do not crush or chew. 90 capsule 1    tezepelumab-ekko (Tezspire) SubQ Pen Injector Inject 1.9 mL (210 mg) under the skin every 30 (thirty) days. 1.91 mL 11    ipratropium-albuteroL (Duo-Neb) 0.5-2.5 mg/3 mL nebulizer solution Take 3 mL by nebulization every 6 hours. 180 mL 11     No current facility-administered medications for this visit.       Physical Exam  /85   Pulse 69   Wt 75.5 kg (166 lb 6.4 oz)   SpO2 99%   BMI 26.06 kg/m²      No Known Allergies    General Appearance:  Alert, cooperative, no distress,   Head:  Normocephalic, atraumatic   Eyes:  PERRL, conjunctiva/corneas clear, EOM's intact,    Lungs:   Clear to auscultation bilaterally, respirations unlabored   Heart:  Regular rate and rhythm, S1 and S2 normal, no murmur,    Abdomen:   Soft, non-tender, bowel sounds active all four quadrants,  no masses, no organomegaly   Extremities: Extremities normal, No edema   Neurologic: Normal        Assessment/Plan   Diagnoses and all orders for this visit:  Eden was seen today for follow-up.  Diagnoses and all orders for this visit:  Other hyperlipidemia (Primary)  Severe persistent asthma without complication (Multi)  BMI 26.0-26.9,adult  Abnormal weight gain        Severe persistent asthma  Continue Tezspire shots  Continue carbinoxamine  Add Singulair  consider gluten-free dairy free diet  Continue vitamin D  Follow-up pulmonary  Follow-up allergy immunology April 2025    Chronic anxiety  Continue BuSpar 3 times daily  Continue B12    Chronic constipation patient on Linzess    We did discuss about intermittent fasting and calorie counting and calorie burning patient was advised to consider breakfast and lunch and fruits for dinner continue with daily exercise weight lifting and Pilates advised to consider calories restricted to less than 1500 a day    I spent 15 minutes on obesity councelling. Pt was advised to loose weight. Discussed at length about various ways of loosing weight including healthy Diet, daily Aerobic exercise, calorie counting bariatric surgery, calorie deficit with portion control method, and medications. recommend patient maintain a diet of  1500calories.        Following GYN for Pap and mammogram

## 2025-05-09 ENCOUNTER — APPOINTMENT (OUTPATIENT)
Dept: PRIMARY CARE | Facility: CLINIC | Age: 40
End: 2025-05-09
Payer: COMMERCIAL

## 2025-05-23 DIAGNOSIS — F41.9 ANXIETY: ICD-10-CM

## 2025-05-23 RX ORDER — BUSPIRONE HYDROCHLORIDE 7.5 MG/1
7.5 TABLET ORAL 3 TIMES DAILY
Qty: 270 TABLET | Refills: 1 | Status: SHIPPED | OUTPATIENT
Start: 2025-05-23

## 2025-06-24 ENCOUNTER — APPOINTMENT (OUTPATIENT)
Dept: ALLERGY | Facility: CLINIC | Age: 40
End: 2025-06-24
Payer: COMMERCIAL

## 2025-06-24 VITALS
HEIGHT: 67 IN | DIASTOLIC BLOOD PRESSURE: 68 MMHG | WEIGHT: 164 LBS | BODY MASS INDEX: 25.74 KG/M2 | SYSTOLIC BLOOD PRESSURE: 108 MMHG | TEMPERATURE: 98.3 F | HEART RATE: 69 BPM | OXYGEN SATURATION: 99 % | RESPIRATION RATE: 17 BRPM

## 2025-06-24 DIAGNOSIS — J45.901 SEVERE ASTHMA WITH ACUTE EXACERBATION, UNSPECIFIED WHETHER PERSISTENT (HHS-HCC): ICD-10-CM

## 2025-06-24 DIAGNOSIS — B99.9 RECURRENT INFECTIONS: ICD-10-CM

## 2025-06-24 PROCEDURE — 99214 OFFICE O/P EST MOD 30 MIN: CPT | Performed by: ALLERGY & IMMUNOLOGY

## 2025-06-24 PROCEDURE — 96160 PT-FOCUSED HLTH RISK ASSMT: CPT | Performed by: ALLERGY & IMMUNOLOGY

## 2025-06-24 PROCEDURE — 3008F BODY MASS INDEX DOCD: CPT | Performed by: ALLERGY & IMMUNOLOGY

## 2025-06-24 RX ORDER — AZITHROMYCIN 500 MG/1
500 TABLET, FILM COATED ORAL 3 TIMES WEEKLY
Qty: 36 TABLET | Refills: 3 | Status: SHIPPED | OUTPATIENT
Start: 2025-06-25 | End: 2026-06-25

## 2025-06-24 ASSESSMENT — ENCOUNTER SYMPTOMS
CONSTITUTIONAL NEGATIVE: 1
RHINORRHEA: 1
EYES NEGATIVE: 1
ENDOCRINE NEGATIVE: 1
CARDIOVASCULAR NEGATIVE: 1
MUSCULOSKELETAL NEGATIVE: 1
GASTROINTESTINAL NEGATIVE: 1
COUGH: 0

## 2025-06-24 NOTE — PROGRESS NOTES
Patient ID: Eden Quispe is a 40 y.o. female.     Chief Complaint: follow up  History Of Present Illness  Eden Quispe is a 40 y.o. female with PMx severe asthma presenting for follow up.     Trelegy is working well.  Daughter had a cold and patient afraid she is starting to have exacerbation.  Symptoms present for about 5 days.  No wheeze or cough  Mild rhinorrhea, no fever, overall feeling well.    In January 2025, saw Dr. Carr.  Also following with Dr. Carr. Has August follow up.    Review of Systems   Constitutional: Negative.    HENT:  Positive for rhinorrhea.    Eyes: Negative.    Respiratory:  Negative for cough.    Cardiovascular: Negative.    Gastrointestinal: Negative.    Endocrine: Negative.    Musculoskeletal: Negative.    Skin: Negative.        Pertinent positives and negatives have been assessed in the HPI. All other systems have been reviewed and are negative except as noted in the HPI.    Allergies  Patient has no known allergies.    Past Medical History  She has a past medical history of Anxiety and Asthma.    She has no past medical history of Personal history of irradiation.    Family History  Family History   Adopted: Yes   Problem Relation Name Age of Onset    No Known Problems Mother      No Known Problems Father         Surgical History  She has a past surgical history that includes Other surgical history (08/08/2019); Other surgical history (08/08/2019); and Pelvic laparoscopy.    Social/Environmental History  She reports that she has never smoked. She has never been exposed to tobacco smoke. She has never used smokeless tobacco. She reports current alcohol use of about 2.0 standard drinks of alcohol per week. She reports that she does not use drugs.  MEDICATIONS  Current Outpatient Medications on File Prior to Visit   Medication Sig Dispense Refill    albuterol 90 mcg/actuation inhaler Inhale 2 puffs every 4 hours if needed for wheezing.      busPIRone (Buspar) 7.5 mg tablet Take 1  "tablet (7.5 mg) by mouth 3 times a day. 270 tablet 1    carbinoxamine maleate 4 mg tablet TAKE ONE TABLET BY MOUTH EVERY 12HRS      cholecalciferol (Vitamin D3) 25 MCG (1000 UT) capsule Take 1 capsule (25 mcg) by mouth once daily.      cyanocobalamin (Vitamin B-12) 100 mcg tablet Take 1 tablet (100 mcg) by mouth every other day.      fluticasone-umeclidin-vilanter (Trelegy Ellipta) 200-62.5-25 mcg blister with device Inhale 1 puff once daily. 30 each 3    linaCLOtide (Linzess) 145 mcg capsule Take 1 capsule (145 mcg) by mouth once daily in the morning. Take before meals. Do not crush or chew. 90 capsule 1    tezepelumab-ekko (Tezspire) SubQ Pen Injector Inject 1.9 mL (210 mg) under the skin every 30 (thirty) days. 1.91 mL 11    azithromycin (Zithromax) 500 mg tablet Take 1 tablet (500 mg) by mouth 3 times a week. (Patient not taking: Reported on 6/24/2025) 12 tablet 2    fluticasone (Flonase) 50 mcg/actuation nasal spray Administer 2 sprays into each nostril once daily. Shake gently. Before first use, prime pump. After use, clean tip and replace cap. (Patient taking differently: Administer 2 sprays into each nostril if needed. Shake gently. Before first use, prime pump. After use, clean tip and replace cap.) 16 g 5    ipratropium-albuteroL (Duo-Neb) 0.5-2.5 mg/3 mL nebulizer solution Take 3 mL by nebulization every 6 hours. 180 mL 11     No current facility-administered medications on file prior to visit.     Physical Exam  Visit Vitals  /68   Pulse 69   Temp 36.8 °C (98.3 °F) (Temporal)   Resp 17   Ht 1.702 m (5' 7\")   Wt 74.4 kg (164 lb)   SpO2 99%   BMI 25.69 kg/m²   OB Status Having periods   Smoking Status Never   BSA 1.88 m²       Wt Readings from Last 1 Encounters:   06/24/25 74.4 kg (164 lb)       Physical Exam    General: Well appearing, no acute distress  Head: Normocephalic, atraumatic, neck supple without lymphadenopathy  Eyes: EOMI, non-injected  Ears: TM's with normal visible land marks.   Nose: " No nasal crease, nares patent, slightly boggy turbinates, minimal discharge  Throat: Normal dentition, mild erythema  Heart: Regular rate and rhythm  Lungs: Clear to auscultation bilaterally, effort normal.  Extremities: Moves all extremities symmetrically, no edema  Skin: No rashes/lesions  Psych: normal mood and affect    LAB RESULTS:  CBC:  Recent Labs     03/01/25  1102 04/13/24  0928 12/20/22  1543 09/23/22  1347 03/15/22  1402   WBC 4.4 8.6 5.8 6.1 6.2   HGB 12.8 12.8 12.9 12.7 13.0   HCT 38.0 39.8 39.7 39.4 40.9    221 157 181 164   MCV 85.2 88 84 87 88   EOSABS  --   --  0.09 0.08 0.22       CMP:  Recent Labs     03/01/25  1102 04/13/24  0928 12/20/22  1543    137 137   K 4.5 3.9 3.9    103 102   CO2 25 25 28   ANIONGAP 7 13 11   BUN 17 16 15   CREATININE 0.75 0.80 0.94   EGFR 103 >90  --    MG  --  1.98  --      Recent Labs     03/01/25  1102 04/13/24  0928 12/20/22  1543   ALBUMIN 4.5 4.6 4.4   ALKPHOS 63 66 60   ALT 13 22 13   AST 15 21 16   BILITOT 0.5 0.4 0.3       ALLERGY:   Lab Results   Component Value Date    ICIGE 26.8 08/13/2024    WHITEASH <0.10 08/13/2024    SILVERBIRCH <0.10 08/13/2024    BOXELDER <0.10 08/13/2024    MOUNTJUNIPER <0.10 08/13/2024    COTTONWOOD <0.10 08/13/2024    ELM <0.10 08/13/2024    MULBERRY <0.10 08/13/2024    PECANHICKORY <0.10 08/13/2024    MAPLESYCAMOR <0.10 08/13/2024    OAK <0.10 08/13/2024    BERMUDAGR <0.10 08/13/2024    JOHNSONGR <0.10 08/13/2024    BLUEGRASS <0.10 08/13/2024    TIMOTHYGRASS <0.10 08/13/2024     Lab Results   Component Value Date    LAMBQUART <0.10 08/13/2024    PIGWEED <0.10 08/13/2024    COMRAGWEED <0.10 08/13/2024    RUSSIANT <0.10 08/13/2024    SHEEPSOR <0.10 08/13/2024    PLANTAIN <0.10 08/13/2024    CATEPI <0.10 08/13/2024    DOGEPI 0.25 (Equiv IgE) 08/13/2024    ALTERNA 0.72 (Mod) 08/13/2024    CLADHERB <0.10 08/13/2024    ICA04 <0.10 08/13/2024    PENICILLIUM <0.10 08/13/2024    DERMFAR 0.27 (Equiv IgE) 08/13/2024     DERMPTE 0.24 (Equiv IgE) 08/13/2024    COCKR <0.10 08/13/2024       Recent Labs     08/13/24  1315   ICIGE 26.8     Recent Labs     12/20/22  1543 09/23/22  1347 03/15/22  1402   EOSABS 0.09 0.08 0.22     IMMUNO:   Recent Labs     08/13/24  1315 03/15/22  1402   IGG 1,040 1,070    205   IGM 90 86     HEME/ENDO:  Recent Labs     03/01/25  1102 04/13/24  0928 06/24/20  1459   TSH 1.30 1.50 1.57   HGBA1C  --  5.1  --      ACT - 25       Assessment/Plan   Eden is a 41 yo woman with allergic rhinitis to dust mite dog and mold. She is doing well on Tezspire without side effects. We discussed consideration of prophylaxis with Zithromax.    This plan is effective and will continue.  Continue current medication and follow up in 3 months  Leonela Simon DO

## 2025-07-08 ENCOUNTER — APPOINTMENT (OUTPATIENT)
Dept: PRIMARY CARE | Facility: CLINIC | Age: 40
End: 2025-07-08
Payer: COMMERCIAL

## 2025-07-15 ENCOUNTER — OFFICE VISIT (OUTPATIENT)
Dept: PRIMARY CARE | Facility: CLINIC | Age: 40
End: 2025-07-15
Payer: COMMERCIAL

## 2025-07-15 VITALS
HEART RATE: 84 BPM | SYSTOLIC BLOOD PRESSURE: 87 MMHG | OXYGEN SATURATION: 98 % | DIASTOLIC BLOOD PRESSURE: 52 MMHG | WEIGHT: 152.8 LBS | BODY MASS INDEX: 23.93 KG/M2

## 2025-07-15 DIAGNOSIS — J02.9 SORE THROAT: ICD-10-CM

## 2025-07-15 DIAGNOSIS — J45.909 ASTHMA, UNSPECIFIED ASTHMA SEVERITY, UNSPECIFIED WHETHER COMPLICATED, UNSPECIFIED WHETHER PERSISTENT (HHS-HCC): Primary | ICD-10-CM

## 2025-07-15 DIAGNOSIS — J02.9 ACUTE PHARYNGITIS, UNSPECIFIED ETIOLOGY: ICD-10-CM

## 2025-07-15 LAB
NON-UH HIE RAPID STREP AG: NORMAL
NON-UH HIE RS INT QC: NORMAL

## 2025-07-15 PROCEDURE — 1036F TOBACCO NON-USER: CPT | Performed by: FAMILY MEDICINE

## 2025-07-15 PROCEDURE — 99213 OFFICE O/P EST LOW 20 MIN: CPT | Performed by: FAMILY MEDICINE

## 2025-07-15 RX ORDER — IBUPROFEN 600 MG/1
600 TABLET, FILM COATED ORAL EVERY 8 HOURS PRN
Qty: 60 TABLET | Refills: 0 | Status: SHIPPED | OUTPATIENT
Start: 2025-07-15 | End: 2026-07-15

## 2025-07-15 RX ORDER — ALBUTEROL SULFATE AND BUDESONIDE 90; 80 UG/1; UG/1
AEROSOL, METERED RESPIRATORY (INHALATION) AS NEEDED
COMMUNITY

## 2025-07-15 RX ORDER — MONTELUKAST SODIUM 10 MG/1
10 TABLET ORAL NIGHTLY
Qty: 30 TABLET | Refills: 0 | Status: SHIPPED | OUTPATIENT
Start: 2025-07-15 | End: 2026-01-11

## 2025-07-15 RX ORDER — BENZONATATE 100 MG/1
100 CAPSULE ORAL 3 TIMES DAILY PRN
Qty: 42 CAPSULE | Refills: 0 | Status: SHIPPED | OUTPATIENT
Start: 2025-07-15 | End: 2025-08-14

## 2025-07-15 ASSESSMENT — PATIENT HEALTH QUESTIONNAIRE - PHQ9
1. LITTLE INTEREST OR PLEASURE IN DOING THINGS: NOT AT ALL
SUM OF ALL RESPONSES TO PHQ9 QUESTIONS 1 AND 2: 0
2. FEELING DOWN, DEPRESSED OR HOPELESS: NOT AT ALL

## 2025-07-15 NOTE — PROGRESS NOTES
SUBJECTIVE:   Patient ID: Eden Quispe is a 40 y.o. female [id number] who presents for Nasal Congestion (Congestion, heaviness in the chest).    Patient is noticing nasal congestion sore throat and throat discomfort.  Denies any odynophagia or dysphagia.  Some chest tightness her taking Flonase for allergies and taking Tezspire and Trelegy for asthma.  Patient unsure whether her allergies are coming back or not.  Patient scheduled to travel to [place].  No sick contact no fever or chills no body ache or muscle pain no ear pain or ear discharge.  Some sinus congestion  Patient denies chest pain or shortness of breath.  Deny headache or dizziness    Social History[1]    Allergies[2]    Current Medications[3]    BP 87/52   Pulse 84   Wt 69.3 kg (152 lb 12.8 oz)   SpO2 98%   BMI 23.93 kg/m²   General: Patient alert and oriented ×3  eyes: Extraocular movement normal.  Ears: External auditory canals normal TM normal bilaterally  Throat: No erythema no exudates  Sinus: No sinus tenderness  Pulmonary : Clear to auscultation bilaterally normal respiration.  Cardiovascular: Normal rate and regular rhythm, no murmurs.   psych: Mood and affect normal.    Assessment/Plan   Diagnoses and all orders for this visit:  Asthma, unspecified asthma severity, unspecified whether complicated, unspecified whether persistent ([facility])  Sore throat  -     montelukast (Singulair) 10 mg tablet; Take 1 tablet (10 mg) by mouth once daily at bedtime.  -     ibuprofen 600 mg tablet; Take 1 tablet (600 mg) by mouth every 8 hours if needed for mild pain (1 - 3) (pain).  -     benzonatate (Tessalon) 100 mg capsule; Take 1 capsule (100 mg) by mouth 3 times a day as needed for cough. Do not crush or chew.  Acute pharyngitis, unspecified etiology  Will get rapid strep test      PLAN:  Symptomatic therapy suggested: rest, increase fluids, OTC acetaminophen, ibuprofen, and call prn if symptoms persist or worsen. Call or return to clinic prn if  these symptoms worsen or fail to improve as anticipated.         [1]   Social History  Socioeconomic History    Marital status:    Tobacco Use    Smoking status: Never     Passive exposure: Never    Smokeless tobacco: Never   Vaping Use    Vaping status: Never Used   Substance and Sexual Activity    Alcohol use: Yes     Alcohol/week: 1.0 standard drink of alcohol     Types: 1 Glasses of wine per week    Drug use: Never    Sexual activity: Yes     Partners: Male     Birth control/protection: None   [2] No Known Allergies  [3]   Current Outpatient Medications   Medication Sig Dispense Refill    albuterol 90 mcg/actuation inhaler Inhale 2 puffs every 4 hours if needed for wheezing.      albuterol-budesonide (Airsupra) 90-80 mcg/actuation inhaler Inhale if needed.      busPIRone (Buspar) 7.5 mg tablet Take 1 tablet (7.5 mg) by mouth 3 times a day. 270 tablet 1    carbinoxamine maleate 4 mg tablet TAKE ONE TABLET BY MOUTH EVERY 12HRS      cholecalciferol (Vitamin D3) 25 MCG (1000 UT) capsule Take 1 capsule (25 mcg) by mouth once daily.      cyanocobalamin (Vitamin B-12) 100 mcg tablet Take 1 tablet (100 mcg) by mouth every other day.      fluticasone-umeclidin-vilanter (Trelegy Ellipta) 200-62.5-25 mcg blister with device Inhale 1 puff once daily. 30 each 3    linaCLOtide (Linzess) 145 mcg capsule Take 1 capsule (145 mcg) by mouth once daily in the morning. Take before meals. Do not crush or chew. 90 capsule 1    tezepelumab-ekko (Tezspire) SubQ Pen Injector Inject 1.9 mL (210 mg) under the skin every 30 (thirty) days. 1.91 mL 11    azithromycin (Zithromax) 500 mg tablet Take 1 tablet (500 mg) by mouth 3 times a week. (Patient not taking: Reported on 7/15/2025) 36 tablet 3    fluticasone (Flonase) 50 mcg/actuation nasal spray Administer 2 sprays into each nostril once daily. Shake gently. Before first use, prime pump. After use, clean tip and replace cap. (Patient taking differently: Administer 2 sprays into each  nostril if needed. Shake gently. Before first use, prime pump. After use, clean tip and replace cap.) 16 g 5    ipratropium-albuteroL (Duo-Neb) 0.5-2.5 mg/3 mL nebulizer solution Take 3 mL by nebulization every 6 hours. 180 mL 11     No current facility-administered medications for this visit.

## 2025-07-24 DIAGNOSIS — J45.50 SEVERE PERSISTENT ASTHMA, UNSPECIFIED WHETHER COMPLICATED (MULTI): ICD-10-CM

## 2025-07-24 RX ORDER — TEZEPELUMAB-EKKO 210 MG/1.9ML
INJECTION, SOLUTION SUBCUTANEOUS
Qty: 1.91 ML | Refills: 11 | Status: SHIPPED | OUTPATIENT
Start: 2025-07-24

## 2025-09-23 ENCOUNTER — APPOINTMENT (OUTPATIENT)
Dept: ALLERGY | Facility: CLINIC | Age: 40
End: 2025-09-23
Payer: COMMERCIAL

## 2026-01-14 ENCOUNTER — APPOINTMENT (OUTPATIENT)
Dept: OBSTETRICS AND GYNECOLOGY | Facility: CLINIC | Age: 41
End: 2026-01-14
Payer: COMMERCIAL

## 2026-01-22 ENCOUNTER — APPOINTMENT (OUTPATIENT)
Dept: OBSTETRICS AND GYNECOLOGY | Facility: CLINIC | Age: 41
End: 2026-01-22
Payer: COMMERCIAL

## 2026-04-08 ENCOUNTER — APPOINTMENT (OUTPATIENT)
Dept: PRIMARY CARE | Facility: CLINIC | Age: 41
End: 2026-04-08
Payer: COMMERCIAL